# Patient Record
Sex: FEMALE | Race: WHITE | NOT HISPANIC OR LATINO | Employment: OTHER | URBAN - METROPOLITAN AREA
[De-identification: names, ages, dates, MRNs, and addresses within clinical notes are randomized per-mention and may not be internally consistent; named-entity substitution may affect disease eponyms.]

---

## 2023-03-20 DIAGNOSIS — F41.1 GENERALIZED ANXIETY DISORDER WITH PANIC ATTACKS: Primary | ICD-10-CM

## 2023-03-20 DIAGNOSIS — F41.0 GENERALIZED ANXIETY DISORDER WITH PANIC ATTACKS: Primary | ICD-10-CM

## 2023-03-20 DIAGNOSIS — G44.209 ACUTE NON INTRACTABLE TENSION-TYPE HEADACHE: ICD-10-CM

## 2023-03-20 PROBLEM — G43.909 MIGRAINE HEADACHE: Status: ACTIVE | Noted: 2023-03-20

## 2023-03-20 RX ORDER — CLORAZEPATE DIPOTASSIUM 3.75 MG/1
TABLET ORAL
COMMUNITY
End: 2023-03-20 | Stop reason: SDUPTHER

## 2023-03-20 RX ORDER — BUTALBITAL, ACETAMINOPHEN AND CAFFEINE 300; 40; 50 MG/1; MG/1; MG/1
1 CAPSULE ORAL
COMMUNITY
Start: 2020-01-14 | End: 2023-03-20 | Stop reason: SDUPTHER

## 2023-03-20 RX ORDER — BUTALBITAL, ACETAMINOPHEN AND CAFFEINE 300; 40; 50 MG/1; MG/1; MG/1
1 CAPSULE ORAL DAILY PRN
Qty: 12 CAPSULE | Refills: 2 | Status: SHIPPED | OUTPATIENT
Start: 2023-03-20 | End: 2024-05-31 | Stop reason: WASHOUT

## 2023-03-20 RX ORDER — CLORAZEPATE DIPOTASSIUM 3.75 MG/1
3.75 TABLET ORAL 2 TIMES DAILY PRN
Qty: 60 TABLET | Refills: 0 | Status: SHIPPED | OUTPATIENT
Start: 2023-03-20 | End: 2023-05-18

## 2023-03-20 NOTE — PROGRESS NOTES
30 day supply of clorazepate sent to pharmacy along with Fioricet.    I have personally reviewed the OARRS report.  This report is scanned into the electronic medical record.  I have considered the risks of abuse, dependence, addiction and diversion.  I believe that it is clinically appropriate to prescribe this medication. Masha Wallace, DO

## 2023-03-20 NOTE — PROGRESS NOTES
Email from patient:    Hi. This is Alondra Magaña, [birth year].  Could you please refill my Fioracet and my Tranxene. My psychiatrist was prescribing my Tranxene but he is no longer a [facility] provider. I am seeing someone new next month.  You have prescribed my Fioracet, but it's been a couple years since it's been ordered. I only use it on occasion.  Unfortunately, with my mom sick, I have used it a little more often for tension headaches.  I didn't call in because it's a little easier when I want to include explanations.  I still use [facility] in [place].  Thank you.    I see you in May.

## 2023-05-04 PROBLEM — F51.04 CHRONIC INSOMNIA: Status: ACTIVE | Noted: 2023-05-04

## 2023-05-04 PROBLEM — Z98.84 STATUS POST BARIATRIC SURGERY: Status: ACTIVE | Noted: 2023-05-04

## 2023-05-04 PROBLEM — R94.4 DECREASED GFR: Status: ACTIVE | Noted: 2023-05-04

## 2023-05-04 PROBLEM — R82.998 SWEET URINE ODOR: Status: ACTIVE | Noted: 2023-05-04

## 2023-05-04 PROBLEM — E55.9 VITAMIN D DEFICIENCY: Status: ACTIVE | Noted: 2023-05-04

## 2023-05-04 PROBLEM — G47.33 OBSTRUCTIVE SLEEP APNEA: Status: ACTIVE | Noted: 2023-05-04

## 2023-05-04 PROBLEM — F32.1 DEPRESSION, MAJOR, SINGLE EPISODE, MODERATE (MULTI): Status: ACTIVE | Noted: 2023-05-04

## 2023-05-04 PROBLEM — M54.2 NECK PAIN: Status: ACTIVE | Noted: 2023-05-04

## 2023-05-04 PROBLEM — R30.0 DYSURIA: Status: ACTIVE | Noted: 2023-05-04

## 2023-05-04 PROBLEM — B37.2 CANDIDAL INTERTRIGO: Status: ACTIVE | Noted: 2023-05-04

## 2023-05-04 PROBLEM — E11.9 CONTROLLED TYPE 2 DIABETES MELLITUS WITHOUT COMPLICATION, WITHOUT LONG-TERM CURRENT USE OF INSULIN (MULTI): Status: ACTIVE | Noted: 2023-05-04

## 2023-05-04 RX ORDER — DULAGLUTIDE 1.5 MG/.5ML
INJECTION, SOLUTION SUBCUTANEOUS
COMMUNITY
Start: 2019-11-20 | End: 2023-11-30 | Stop reason: SDUPTHER

## 2023-05-04 RX ORDER — KETOCONAZOLE 20 MG/G
CREAM TOPICAL
COMMUNITY
Start: 2022-06-06 | End: 2023-11-30 | Stop reason: ALTCHOICE

## 2023-05-04 RX ORDER — LANCETS
EACH MISCELLANEOUS
COMMUNITY
Start: 2022-12-06

## 2023-05-04 RX ORDER — TRAZODONE HYDROCHLORIDE 100 MG/1
100 TABLET ORAL NIGHTLY PRN
COMMUNITY
End: 2023-08-16

## 2023-05-04 RX ORDER — EPINEPHRINE 0.3 MG/.3ML
0.3 INJECTION SUBCUTANEOUS
COMMUNITY
Start: 2020-12-28 | End: 2024-01-11 | Stop reason: ALTCHOICE

## 2023-05-04 RX ORDER — LAMOTRIGINE 100 MG/1
1 TABLET ORAL 2 TIMES DAILY
COMMUNITY

## 2023-05-04 RX ORDER — VENLAFAXINE 50 MG/1
50 TABLET ORAL 2 TIMES DAILY
COMMUNITY
End: 2023-05-08

## 2023-05-04 RX ORDER — BLOOD SUGAR DIAGNOSTIC
STRIP MISCELLANEOUS
COMMUNITY
Start: 2022-12-06

## 2023-05-04 RX ORDER — CEFUROXIME AXETIL 250 MG/1
6 TABLET ORAL AS NEEDED
COMMUNITY
Start: 2020-01-14 | End: 2024-05-31 | Stop reason: WASHOUT

## 2023-05-04 RX ORDER — METHYLPHENIDATE HYDROCHLORIDE 20 MG/1
TABLET ORAL
COMMUNITY
Start: 2023-05-17 | End: 2023-11-30 | Stop reason: WASHOUT

## 2023-05-08 DIAGNOSIS — F32.1 DEPRESSION, MAJOR, SINGLE EPISODE, MODERATE (MULTI): ICD-10-CM

## 2023-05-08 DIAGNOSIS — F32.1 DEPRESSION, MAJOR, SINGLE EPISODE, MODERATE (MULTI): Primary | ICD-10-CM

## 2023-05-08 PROBLEM — F41.0 PANIC DISORDER: Status: ACTIVE | Noted: 2023-05-08

## 2023-05-08 PROBLEM — U07.1 COVID-19 VIRUS INFECTION: Status: ACTIVE | Noted: 2023-05-08

## 2023-05-08 PROBLEM — Z91.013 SHELLFISH ALLERGY: Status: ACTIVE | Noted: 2023-05-08

## 2023-05-08 PROBLEM — F41.1 GENERALIZED ANXIETY DISORDER: Status: ACTIVE | Noted: 2023-05-08

## 2023-05-08 RX ORDER — VENLAFAXINE 50 MG/1
TABLET ORAL
Qty: 90 TABLET | Refills: 1 | Status: SHIPPED | OUTPATIENT
Start: 2023-05-08 | End: 2023-08-11

## 2023-05-08 RX ORDER — VENLAFAXINE 50 MG/1
TABLET ORAL
Qty: 90 TABLET | Refills: 1 | Status: SHIPPED | OUTPATIENT
Start: 2023-05-08 | End: 2023-05-08

## 2023-05-08 RX ORDER — VENLAFAXINE 50 MG/1
TABLET ORAL
Qty: 60 TABLET | Refills: 2 | Status: SHIPPED | OUTPATIENT
Start: 2023-05-08 | End: 2023-05-08

## 2023-05-08 RX ORDER — CLORAZEPATE DIPOTASSIUM 3.75 MG/1
3.75 TABLET ORAL 2 TIMES DAILY PRN
COMMUNITY

## 2023-05-18 ENCOUNTER — OFFICE VISIT (OUTPATIENT)
Dept: PRIMARY CARE | Facility: CLINIC | Age: 63
End: 2023-05-18
Payer: COMMERCIAL

## 2023-05-18 VITALS
SYSTOLIC BLOOD PRESSURE: 112 MMHG | DIASTOLIC BLOOD PRESSURE: 76 MMHG | HEIGHT: 64 IN | HEART RATE: 91 BPM | OXYGEN SATURATION: 96 % | BODY MASS INDEX: 36.7 KG/M2 | WEIGHT: 215 LBS

## 2023-05-18 DIAGNOSIS — E11.9 CONTROLLED TYPE 2 DIABETES MELLITUS WITHOUT COMPLICATION, WITHOUT LONG-TERM CURRENT USE OF INSULIN (MULTI): ICD-10-CM

## 2023-05-18 DIAGNOSIS — Z00.00 ANNUAL PHYSICAL EXAM: Primary | ICD-10-CM

## 2023-05-18 DIAGNOSIS — N95.1 MENOPAUSAL SYMPTOMS: ICD-10-CM

## 2023-05-18 DIAGNOSIS — B37.2 CANDIDAL INTERTRIGO: ICD-10-CM

## 2023-05-18 DIAGNOSIS — F33.1 MAJOR DEPRESSIVE DISORDER, RECURRENT EPISODE, MODERATE (MULTI): ICD-10-CM

## 2023-05-18 PROBLEM — Z98.84 H/O GASTRIC BYPASS: Status: ACTIVE | Noted: 2021-05-20

## 2023-05-18 PROBLEM — U07.1 COVID-19 VIRUS INFECTION: Status: RESOLVED | Noted: 2023-05-08 | Resolved: 2023-05-18

## 2023-05-18 PROBLEM — F41.9 ANXIETY: Status: ACTIVE | Noted: 2021-05-20

## 2023-05-18 PROBLEM — Z98.84 H/O GASTRIC BYPASS: Status: RESOLVED | Noted: 2021-05-20 | Resolved: 2023-05-18

## 2023-05-18 PROBLEM — F41.9 ANXIETY: Status: RESOLVED | Noted: 2021-05-20 | Resolved: 2023-05-18

## 2023-05-18 PROBLEM — R94.4 DECREASED GFR: Status: RESOLVED | Noted: 2023-05-04 | Resolved: 2023-05-18

## 2023-05-18 PROBLEM — H02.9 EYELID LESION, BENIGN: Chronic | Status: RESOLVED | Noted: 2020-10-15 | Resolved: 2023-05-18

## 2023-05-18 PROBLEM — H02.831 DERMATOCHALASIS OF BOTH UPPER EYELIDS: Chronic | Status: ACTIVE | Noted: 2021-10-21

## 2023-05-18 PROBLEM — H02.9 EYELID LESION, BENIGN: Chronic | Status: ACTIVE | Noted: 2020-10-15

## 2023-05-18 PROBLEM — H43.813 PVD (POSTERIOR VITREOUS DETACHMENT), BILATERAL: Chronic | Status: ACTIVE | Noted: 2017-09-21

## 2023-05-18 PROBLEM — R41.89 BRAIN FOG: Status: ACTIVE | Noted: 2023-04-24

## 2023-05-18 PROBLEM — R30.0 DYSURIA: Status: RESOLVED | Noted: 2023-05-04 | Resolved: 2023-05-18

## 2023-05-18 PROBLEM — F41.1 GENERALIZED ANXIETY DISORDER: Status: RESOLVED | Noted: 2023-05-08 | Resolved: 2023-05-18

## 2023-05-18 PROBLEM — H02.834 DERMATOCHALASIS OF BOTH UPPER EYELIDS: Chronic | Status: ACTIVE | Noted: 2021-10-21

## 2023-05-18 PROBLEM — R82.998 SWEET URINE ODOR: Status: RESOLVED | Noted: 2023-05-04 | Resolved: 2023-05-18

## 2023-05-18 PROBLEM — F41.0 PANIC DISORDER: Status: RESOLVED | Noted: 2023-05-08 | Resolved: 2023-05-18

## 2023-05-18 PROCEDURE — 99396 PREV VISIT EST AGE 40-64: CPT | Performed by: FAMILY MEDICINE

## 2023-05-18 PROCEDURE — 3078F DIAST BP <80 MM HG: CPT | Performed by: FAMILY MEDICINE

## 2023-05-18 PROCEDURE — 1036F TOBACCO NON-USER: CPT | Performed by: FAMILY MEDICINE

## 2023-05-18 PROCEDURE — 3074F SYST BP LT 130 MM HG: CPT | Performed by: FAMILY MEDICINE

## 2023-05-18 RX ORDER — FLUCONAZOLE 150 MG/1
TABLET ORAL
Qty: 3 TABLET | Refills: 5 | Status: SHIPPED | OUTPATIENT
Start: 2023-05-18 | End: 2023-11-30 | Stop reason: ALTCHOICE

## 2023-05-18 SDOH — ECONOMIC STABILITY: FOOD INSECURITY: WITHIN THE PAST 12 MONTHS, THE FOOD YOU BOUGHT JUST DIDN'T LAST AND YOU DIDN'T HAVE MONEY TO GET MORE.: NEVER TRUE

## 2023-05-18 SDOH — ECONOMIC STABILITY: FOOD INSECURITY: WITHIN THE PAST 12 MONTHS, YOU WORRIED THAT YOUR FOOD WOULD RUN OUT BEFORE YOU GOT MONEY TO BUY MORE.: NEVER TRUE

## 2023-05-18 ASSESSMENT — PATIENT HEALTH QUESTIONNAIRE - PHQ9
3. TROUBLE FALLING OR STAYING ASLEEP: NOT AT ALL
4. FEELING TIRED OR HAVING LITTLE ENERGY: NEARLY EVERY DAY
SUM OF ALL RESPONSES TO PHQ9 QUESTIONS 1 & 2: 6
9. THOUGHTS THAT YOU WOULD BE BETTER OFF DEAD, OR OF HURTING YOURSELF: NOT AT ALL
2. FEELING DOWN, DEPRESSED OR HOPELESS: NEARLY EVERY DAY
10. IF YOU CHECKED OFF ANY PROBLEMS, HOW DIFFICULT HAVE THESE PROBLEMS MADE IT FOR YOU TO DO YOUR WORK, TAKE CARE OF THINGS AT HOME, OR GET ALONG WITH OTHER PEOPLE: EXTREMELY DIFFICULT
1. LITTLE INTEREST OR PLEASURE IN DOING THINGS: NEARLY EVERY DAY
SUM OF ALL RESPONSES TO PHQ QUESTIONS 1-9: 18
7. TROUBLE CONCENTRATING ON THINGS, SUCH AS READING THE NEWSPAPER OR WATCHING TELEVISION: NEARLY EVERY DAY
5. POOR APPETITE OR OVEREATING: NOT AT ALL
6. FEELING BAD ABOUT YOURSELF - OR THAT YOU ARE A FAILURE OR HAVE LET YOURSELF OR YOUR FAMILY DOWN: NEARLY EVERY DAY
8. MOVING OR SPEAKING SO SLOWLY THAT OTHER PEOPLE COULD HAVE NOTICED. OR THE OPPOSITE, BEING SO FIGETY OR RESTLESS THAT YOU HAVE BEEN MOVING AROUND A LOT MORE THAN USUAL: NEARLY EVERY DAY

## 2023-05-18 ASSESSMENT — LIFESTYLE VARIABLES
SKIP TO QUESTIONS 9-10: 1
HOW MANY STANDARD DRINKS CONTAINING ALCOHOL DO YOU HAVE ON A TYPICAL DAY: PATIENT DOES NOT DRINK
HOW OFTEN DO YOU HAVE SIX OR MORE DRINKS ON ONE OCCASION: NEVER
AUDIT-C TOTAL SCORE: 0
HOW OFTEN DO YOU HAVE A DRINK CONTAINING ALCOHOL: NEVER

## 2023-05-18 NOTE — PATIENT INSTRUCTIONS
Thank you for choosing [facility] for your healthcare.   As always if you have any questions or concerns please do not hesitate to call our office at [phone] or through [employer].    Have a great day!  Masha Wallace, DO

## 2023-05-18 NOTE — PROGRESS NOTES
"Subjective   Patient ID: Alondra Magaña is a 62 y.o. female who presents for Annual Exam.  She started seeing psychiatry. Her dose of Lamictal was increased and methylphenidate was added to her regimen. She is also on venlafaxine. Her depression continues to be bad. She is worried about her close friend who has horses.           Current Outpatient Medications   Medication Sig Dispense Refill    Accu-Chek Guide test strips strip       Accu-Chek Softclix Lancets misc       butalbital-acetaminophen-caff (Fioricet) -40 mg capsule Take 1 capsule by mouth once daily as needed for headaches (not to exceed 3 doses in 7 days due to risk of rebound headache). 12 capsule 2    clorazepate (Tranxene) 3.75 mg tablet Take 1 tablet (3.75 mg) by mouth 2 times a day as needed for anxiety. 60 tablet 0    lamoTRIgine (LaMICtal) 100 mg tablet Take 1 tablet (100 mg) by mouth once daily at bedtime. 75 mg AM  75 mg PM      methylphenidate (Ritalin) 20 mg tablet 1 tablet in the morning 1/2 tablet in the afternoon Do not start before May 17, 2023.      SUMAtriptan (Imitrex) 6 mg/0.5 mL injection Inject under the skin.      traZODone (Desyrel) 100 mg tablet Take 1 tablet (100 mg) by mouth as needed at bedtime.      Trulicity 1.5 mg/0.5 mL pen injector Inject under the skin.      venlafaxine (Effexor) 50 mg tablet TAKE 1 TABLET BY MOUTH TWICE DAILY 90 tablet 1    cariprazine (Vraylar) 1.5 mg capsule 1 capsule Mon and Friday      clorazepate (Tranxene) 3.75 mg tablet Take 1 tablet (3.75 mg) by mouth.      EPINEPHrine 0.3 mg/0.3 mL injection syringe 0.3 mL (0.3 mg). As Directed      fluconazole (Diflucan) 150 mg tablet 1 tablet by mouth every 3 days for 3 doses 3 tablet 5    ketoconazole (NIZOral) 2 % cream APPLY CREAM TOPICALLY DAILY FOR 2 WEEKS TO AFFECTED AREA       No current facility-administered medications for this visit.       Objective     Visit Vitals  /76   Pulse 91   Ht 1.626 m (5' 4\")   Wt 97.5 kg (215 lb)   SpO2 96% "   BMI 36.90 kg/m²   OB Status Postmenopausal   Smoking Status Never   BSA 2.1 m²        Constitutional: Well nourished, well developed, appears stated age  Eyes: no scleral icterus, no conjunctival injection  Ears: normal external auditory canal, no retraction or bulging of tympanic membranes  Neck: no thyromegaly  Cardiovascular: regular rate and rhythm, no leg edema  Respiratory: normal respiratory effort, clear to auscultation bilaterally  Musculoskeletal: No gross deformities appreciated  Skin: Warm, dry. No rashes  Neurologic: Alert, CNs II-XII grossly intact..  Psych: Appropriate mood and affect.        Assessment/Plan   Problem List Items Addressed This Visit          Endocrine/Metabolic    Controlled type 2 diabetes mellitus without complication, without long-term current use of insulin (CMS/HCC)     Wide variation in blood sugar, trying to adjust diet  Continue Trulicity         Relevant Orders    Comprehensive Metabolic Panel    Hemoglobin A1C    TSH with reflex to Free T4 if abnormal       Infectious/Inflammatory    Candidal intertrigo    Relevant Medications    fluconazole (Diflucan) 150 mg tablet       Other    Major depressive disorder, recurrent episode, moderate (CMS/HCC)     Uncontrolled, situational  Established with psychiatry          Other Visit Diagnoses       Annual physical exam    -  Primary    Has order for mammogram, plans to schedule          Follow up 6 months.     Masha Wallace, DO

## 2023-06-02 ENCOUNTER — LAB (OUTPATIENT)
Dept: LAB | Facility: LAB | Age: 63
End: 2023-06-02
Payer: COMMERCIAL

## 2023-06-02 DIAGNOSIS — E11.9 CONTROLLED TYPE 2 DIABETES MELLITUS WITHOUT COMPLICATION, WITHOUT LONG-TERM CURRENT USE OF INSULIN (MULTI): ICD-10-CM

## 2023-06-02 DIAGNOSIS — N95.1 MENOPAUSAL SYMPTOMS: ICD-10-CM

## 2023-06-02 LAB
ALANINE AMINOTRANSFERASE (SGPT) (U/L) IN SER/PLAS: 19 U/L (ref 7–45)
ALBUMIN (G/DL) IN SER/PLAS: 4 G/DL (ref 3.4–5)
ALKALINE PHOSPHATASE (U/L) IN SER/PLAS: 74 U/L (ref 33–136)
ANION GAP IN SER/PLAS: 10 MMOL/L (ref 10–20)
ASPARTATE AMINOTRANSFERASE (SGOT) (U/L) IN SER/PLAS: 18 U/L (ref 9–39)
BILIRUBIN TOTAL (MG/DL) IN SER/PLAS: 0.6 MG/DL (ref 0–1.2)
CALCIUM (MG/DL) IN SER/PLAS: 9.2 MG/DL (ref 8.6–10.3)
CARBON DIOXIDE, TOTAL (MMOL/L) IN SER/PLAS: 29 MMOL/L (ref 21–32)
CHLORIDE (MMOL/L) IN SER/PLAS: 104 MMOL/L (ref 98–107)
CREATININE (MG/DL) IN SER/PLAS: 0.88 MG/DL (ref 0.5–1.05)
GFR FEMALE: 74 ML/MIN/1.73M2
GLUCOSE (MG/DL) IN SER/PLAS: 86 MG/DL (ref 74–99)
POTASSIUM (MMOL/L) IN SER/PLAS: 4 MMOL/L (ref 3.5–5.3)
PROTEIN TOTAL: 6.5 G/DL (ref 6.4–8.2)
SODIUM (MMOL/L) IN SER/PLAS: 139 MMOL/L (ref 136–145)
THYROTROPIN (MIU/L) IN SER/PLAS BY DETECTION LIMIT <= 0.05 MIU/L: 0.56 MIU/L (ref 0.44–3.98)
UREA NITROGEN (MG/DL) IN SER/PLAS: 11 MG/DL (ref 6–23)

## 2023-06-02 PROCEDURE — 84443 ASSAY THYROID STIM HORMONE: CPT

## 2023-06-02 PROCEDURE — 82670 ASSAY OF TOTAL ESTRADIOL: CPT

## 2023-06-02 PROCEDURE — 36415 COLL VENOUS BLD VENIPUNCTURE: CPT

## 2023-06-02 PROCEDURE — 83001 ASSAY OF GONADOTROPIN (FSH): CPT

## 2023-06-02 PROCEDURE — 80053 COMPREHEN METABOLIC PANEL: CPT

## 2023-06-02 PROCEDURE — 84403 ASSAY OF TOTAL TESTOSTERONE: CPT

## 2023-06-02 PROCEDURE — 83002 ASSAY OF GONADOTROPIN (LH): CPT

## 2023-06-02 PROCEDURE — 83036 HEMOGLOBIN GLYCOSYLATED A1C: CPT

## 2023-06-02 PROCEDURE — 84144 ASSAY OF PROGESTERONE: CPT

## 2023-06-03 LAB
ESTIMATED AVERAGE GLUCOSE FOR HBA1C: 123 MG/DL
ESTRADIOL (PG/ML) IN SER/PLAS: <19 PG/ML
FOLLITROPIN (IU/L) IN SER/PLAS: 83.4 IU/L
HEMOGLOBIN A1C/HEMOGLOBIN TOTAL IN BLOOD: 5.9 %
LUTEINIZING HORMONE (IU/ML) IN SER/PLAS: 30.7 IU/L
PROGESTERONE (NG/ML) IN SER/PLAS: <0.3 NG/ML
TESTOSTERONE (NG/DL) IN SER/PLAS: <60 NG/DL (ref 0–70)

## 2023-08-08 ENCOUNTER — TELEPHONE (OUTPATIENT)
Dept: PRIMARY CARE | Facility: CLINIC | Age: 63
End: 2023-08-08

## 2023-08-08 DIAGNOSIS — E11.9 CONTROLLED TYPE 2 DIABETES MELLITUS WITHOUT COMPLICATION, WITHOUT LONG-TERM CURRENT USE OF INSULIN (MULTI): Primary | ICD-10-CM

## 2023-08-08 RX ORDER — BLOOD-GLUCOSE,RECEIVER,CONT
EACH MISCELLANEOUS
Qty: 1 EACH | Refills: 0 | Status: SHIPPED | OUTPATIENT
Start: 2023-08-08 | End: 2023-11-30 | Stop reason: WASHOUT

## 2023-08-08 RX ORDER — BLOOD-GLUCOSE SENSOR
EACH MISCELLANEOUS
Qty: 3 EACH | Refills: 11 | Status: SHIPPED | OUTPATIENT
Start: 2023-08-08 | End: 2023-11-30 | Stop reason: WASHOUT

## 2023-08-08 NOTE — TELEPHONE ENCOUNTER
PT CALLED ASKING FOR DEXCOM G7 (3 IN BOX) BLOOD PRESSURE MONITOR THAT YOU WEAR ON YOUR ARM. SHE SAID SHE EMAILED DR. LOPEZ, BUT DIDN'T GET A RESPONSE. PLEASE ADVISE.    PLEASE SEND TO [facility] [phone]

## 2023-08-08 NOTE — TELEPHONE ENCOUNTER
I never got her email.  Rx submitted to requested pharmacy.   Can you let her know? Thanks,  Masha Wallace, DO

## 2023-08-09 ENCOUNTER — TELEPHONE (OUTPATIENT)
Dept: PRIMARY CARE | Facility: CLINIC | Age: 63
End: 2023-08-09

## 2023-08-09 NOTE — TELEPHONE ENCOUNTER
Insurance plan excludes Dexcom from coverage. Can you let her know?   Thanks,  Masha Wallace, DO

## 2023-08-09 NOTE — TELEPHONE ENCOUNTER
Patient said that it is affordable out of pocket if it goes through the [facility] pharmacy.

## 2023-08-11 DIAGNOSIS — F32.1 DEPRESSION, MAJOR, SINGLE EPISODE, MODERATE (MULTI): ICD-10-CM

## 2023-08-11 RX ORDER — VENLAFAXINE 50 MG/1
TABLET ORAL
Qty: 90 TABLET | Refills: 3 | Status: SHIPPED | OUTPATIENT
Start: 2023-08-11 | End: 2023-08-11

## 2023-08-11 RX ORDER — VENLAFAXINE 50 MG/1
50 TABLET ORAL 2 TIMES DAILY
Qty: 180 TABLET | Refills: 3 | Status: SHIPPED | OUTPATIENT
Start: 2023-08-11

## 2023-08-11 RX ORDER — LAMOTRIGINE 200 MG/1
200 TABLET ORAL DAILY
COMMUNITY
Start: 2023-08-03 | End: 2023-11-30 | Stop reason: DRUGHIGH

## 2023-08-15 DIAGNOSIS — F51.04 CHRONIC INSOMNIA: Primary | ICD-10-CM

## 2023-08-16 RX ORDER — TRAZODONE HYDROCHLORIDE 100 MG/1
100 TABLET ORAL NIGHTLY PRN
Qty: 90 TABLET | Refills: 3 | Status: SHIPPED | OUTPATIENT
Start: 2023-08-16 | End: 2024-08-15

## 2023-10-25 ENCOUNTER — TELEPHONE (OUTPATIENT)
Dept: PRIMARY CARE | Facility: CLINIC | Age: 63
End: 2023-10-25

## 2023-10-25 DIAGNOSIS — N39.0 ACUTE UTI: Primary | ICD-10-CM

## 2023-10-25 RX ORDER — SULFAMETHOXAZOLE AND TRIMETHOPRIM 800; 160 MG/1; MG/1
1 TABLET ORAL 2 TIMES DAILY
Qty: 6 TABLET | Refills: 0 | Status: SHIPPED | OUTPATIENT
Start: 2023-10-25 | End: 2023-10-28

## 2023-10-25 NOTE — TELEPHONE ENCOUNTER
Email from patient:    I think I have a UTI. I am leaving for vacation on the Thursday.  I have burning and slight pain. Urgency and frequency. Slight odor.   The urgency is killing me. It's terrible if I have on a belt.  I'm hoping it's a UTI and not old age!!  Anyway, can you call something in for me. [facility] on [facility].  It would be terrible to have a problem at the beach.  Thanks,   Christine Magaña  1960

## 2023-10-31 ENCOUNTER — PHARMACY VISIT (OUTPATIENT)
Dept: PHARMACY | Facility: CLINIC | Age: 63
End: 2023-10-31
Payer: COMMERCIAL

## 2023-10-31 PROCEDURE — RXMED WILLOW AMBULATORY MEDICATION CHARGE

## 2023-11-30 ENCOUNTER — OFFICE VISIT (OUTPATIENT)
Dept: PRIMARY CARE | Facility: CLINIC | Age: 63
End: 2023-11-30
Payer: COMMERCIAL

## 2023-11-30 VITALS
HEART RATE: 74 BPM | BODY MASS INDEX: 36.73 KG/M2 | SYSTOLIC BLOOD PRESSURE: 118 MMHG | WEIGHT: 214 LBS | TEMPERATURE: 96.6 F | RESPIRATION RATE: 16 BRPM | OXYGEN SATURATION: 99 % | DIASTOLIC BLOOD PRESSURE: 83 MMHG

## 2023-11-30 DIAGNOSIS — Z98.84 STATUS POST BARIATRIC SURGERY: ICD-10-CM

## 2023-11-30 DIAGNOSIS — E11.9 CONTROLLED TYPE 2 DIABETES MELLITUS WITHOUT COMPLICATION, WITHOUT LONG-TERM CURRENT USE OF INSULIN (MULTI): Primary | ICD-10-CM

## 2023-11-30 DIAGNOSIS — Z12.11 COLON CANCER SCREENING: ICD-10-CM

## 2023-11-30 PROBLEM — N95.1 MENOPAUSAL AND FEMALE CLIMACTERIC STATES: Status: ACTIVE | Noted: 2023-06-02

## 2023-11-30 PROBLEM — F32.A DEPRESSIVE DISORDER: Status: RESOLVED | Noted: 2023-10-19 | Resolved: 2023-11-30

## 2023-11-30 PROBLEM — J40 BRONCHITIS: Status: RESOLVED | Noted: 2023-10-19 | Resolved: 2023-11-30

## 2023-11-30 PROBLEM — F41.9 ANXIETY: Status: RESOLVED | Noted: 2021-05-20 | Resolved: 2023-11-30

## 2023-11-30 PROBLEM — J40 BRONCHITIS: Status: ACTIVE | Noted: 2023-10-19

## 2023-11-30 PROBLEM — B37.2 CANDIDAL INTERTRIGO: Status: RESOLVED | Noted: 2023-05-04 | Resolved: 2023-11-30

## 2023-11-30 PROBLEM — F32.1 DEPRESSION, MAJOR, SINGLE EPISODE, MODERATE (MULTI): Status: RESOLVED | Noted: 2023-05-04 | Resolved: 2023-11-30

## 2023-11-30 PROBLEM — F41.9 ANXIETY: Status: ACTIVE | Noted: 2021-05-20

## 2023-11-30 PROBLEM — F32.A DEPRESSIVE DISORDER: Status: ACTIVE | Noted: 2023-10-19

## 2023-11-30 PROCEDURE — 1036F TOBACCO NON-USER: CPT | Performed by: FAMILY MEDICINE

## 2023-11-30 PROCEDURE — 3079F DIAST BP 80-89 MM HG: CPT | Performed by: FAMILY MEDICINE

## 2023-11-30 PROCEDURE — 3074F SYST BP LT 130 MM HG: CPT | Performed by: FAMILY MEDICINE

## 2023-11-30 PROCEDURE — 3044F HG A1C LEVEL LT 7.0%: CPT | Performed by: FAMILY MEDICINE

## 2023-11-30 PROCEDURE — 99213 OFFICE O/P EST LOW 20 MIN: CPT | Performed by: FAMILY MEDICINE

## 2023-11-30 RX ORDER — BUTYROSPERMUM PARKII(SHEA BUTTER), SIMMONDSIA CHINENSIS (JOJOBA) SEED OIL, ALOE BARBADENSIS LEAF EXTRACT .01; 1; 3.5 G/100G; G/100G; G/100G
250 LIQUID TOPICAL 2 TIMES DAILY
COMMUNITY
End: 2024-01-11 | Stop reason: ALTCHOICE

## 2023-11-30 RX ORDER — CALCITRIOL 0.25 UG/1
0.25 CAPSULE ORAL DAILY
COMMUNITY
End: 2024-01-11 | Stop reason: ALTCHOICE

## 2023-11-30 SDOH — ECONOMIC STABILITY: FOOD INSECURITY: WITHIN THE PAST 12 MONTHS, YOU WORRIED THAT YOUR FOOD WOULD RUN OUT BEFORE YOU GOT MONEY TO BUY MORE.: NEVER TRUE

## 2023-11-30 SDOH — ECONOMIC STABILITY: FOOD INSECURITY: WITHIN THE PAST 12 MONTHS, THE FOOD YOU BOUGHT JUST DIDN'T LAST AND YOU DIDN'T HAVE MONEY TO GET MORE.: NEVER TRUE

## 2023-11-30 ASSESSMENT — LIFESTYLE VARIABLES
AUDIT-C TOTAL SCORE: 0
SKIP TO QUESTIONS 9-10: 1
HOW OFTEN DO YOU HAVE A DRINK CONTAINING ALCOHOL: NEVER
HOW OFTEN DO YOU HAVE SIX OR MORE DRINKS ON ONE OCCASION: NEVER
HOW MANY STANDARD DRINKS CONTAINING ALCOHOL DO YOU HAVE ON A TYPICAL DAY: PATIENT DOES NOT DRINK

## 2023-11-30 ASSESSMENT — PATIENT HEALTH QUESTIONNAIRE - PHQ9
2. FEELING DOWN, DEPRESSED OR HOPELESS: NEARLY EVERY DAY
1. LITTLE INTEREST OR PLEASURE IN DOING THINGS: NEARLY EVERY DAY
SUM OF ALL RESPONSES TO PHQ9 QUESTIONS 1 & 2: 6

## 2023-11-30 ASSESSMENT — PAIN SCALES - GENERAL: PAINLEVEL: 0-NO PAIN

## 2023-11-30 NOTE — PATIENT INSTRUCTIONS
Thank you for choosing [facility] for your healthcare.   As always if you have any questions or concerns please do not hesitate to call our office at [phone] or through [employer].    Have a great day!  Masha Wallace, DO

## 2023-11-30 NOTE — PROGRESS NOTES
Subjective   Patient ID: Alondra Magaña is a 63 y.o. female who presents for Follow-up, Depression, and Diabetes.  Struggling with depression and a lot of stress at work. Trying to cut back on hours or possibly retire. Has follow up with her psychiatrist in 2 weeks.         In addition to that documented in the HPI above, the additional ROS was obtained:  Constitutional: Denies fevers or chills  Eyes: Denies vision changes  ENMT: Denies trouble swallowing  Cardiovascular: Denies chest pain or heart racing  Respiratory: Denies SOB or cough      Current Outpatient Medications   Medication Sig Dispense Refill    Accu-Chek Guide test strips strip       Accu-Chek Softclix Lancets misc       butalbital-acetaminophen-caff (Fioricet) -40 mg capsule Take 1 capsule by mouth once daily as needed for headaches (not to exceed 3 doses in 7 days due to risk of rebound headache). 12 capsule 2    calcitriol (Rocaltrol) 0.25 mcg capsule Take 1 capsule (0.25 mcg) by mouth once daily.      cariprazine (Vraylar) 1.5 mg capsule Three times a week      clorazepate (Tranxene) 3.75 mg tablet Take 1 tablet (3.75 mg) by mouth.      dulaglutide (Trulicity) 1.5 mg/0.5 mL pen injector injection INJECT ONE PEN (1.5 MG) UNDER THE SKIN ONCE WEEKLY 6 mL 3    EPINEPHrine 0.3 mg/0.3 mL injection syringe 0.3 mL (0.3 mg). As Directed      lamoTRIgine (LaMICtal) 100 mg tablet Take 1 tablet (100 mg) by mouth 2 times a day. 100mg AM  100 mg PM      saccharomyces boulardii (Florastor) 250 mg capsule Take 1 capsule (250 mg) by mouth 2 times a day.      SUMAtriptan (Imitrex) 6 mg/0.5 mL injection Inject under the skin.      traZODone (Desyrel) 100 mg tablet Take 1 tablet (100 mg) by mouth as needed at bedtime for sleep. 90 tablet 3    venlafaxine (Effexor) 50 mg tablet Take 1 tablet (50 mg) by mouth 2 times a day. (Patient taking differently: Take 1.5 tablets (75 mg) by mouth 2 times a day.) 180 tablet 3    clorazepate (Tranxene) 3.75 mg tablet Take 1  tablet (3.75 mg) by mouth 2 times a day as needed for anxiety. 60 tablet 0     No current facility-administered medications for this visit.       Objective     Visit Vitals  /83 (BP Location: Left arm, Patient Position: Sitting, BP Cuff Size: Adult)   Pulse 74   Temp 35.9 °C (96.6 °F) (Temporal)   Resp 16   Wt 97.1 kg (214 lb)   SpO2 99%   BMI 36.73 kg/m²   OB Status Postmenopausal   Smoking Status Never   BSA 2.09 m²        Constitutional: Well nourished, well developed, appears stated age  Eyes: no scleral icterus, no conjunctival injection  Ears: normal external auditory canal, no retraction or bulging of tympanic membranes  Neck: no thyromegaly  Cardiovascular: regular rate and rhythm, no leg edema  Respiratory: normal respiratory effort, clear to auscultation bilaterally  Musculoskeletal: No gross deformities appreciated  Skin: Warm, dry. No rashes  Neurologic: Alert, CNs II-XII grossly intact..  Psych: Appropriate mood and affect.      Diabetic Foot Exam  Pulses:  Dorsalis pedis  +2  Edema: There is no lower extremity edema bilaterally.  Sensation to 5.07 [place] nylon filament: Last performed today.      Toes            normal sensation       Distal Foot  normal sensation      Heel             normal sensation                                Skin:      Skin Condition:  no eczematous changes, no edema, no evidence of bleeding or bruising, no lesions noted, no periungual infections, and normal.                There is not evidence of macerated tissue between toe spaces.       Nail Exam: normal nails without lesions.     Open ulcerations: No.      Calluses: No.    There is not foot deformities.        Assessment/Plan   Problem List Items Addressed This Visit       Controlled type 2 diabetes mellitus without complication, without long-term current use of insulin (CMS/[facility]) - Primary (Chronic)     Controlled, Continue Trulicity         Relevant Orders    Hemoglobin A1C    Comprehensive Metabolic Panel     Lipid Panel    Albumin , Urine Random    Status post bariatric surgery    Relevant Orders    CBC     Other Visit Diagnoses       Colon cancer screening        Relevant Orders    Referral to Gastroenterology            Follow up 6 months.    Masha Wallace, DO

## 2023-12-06 ENCOUNTER — LAB (OUTPATIENT)
Dept: LAB | Facility: LAB | Age: 63
End: 2023-12-06
Payer: COMMERCIAL

## 2023-12-06 DIAGNOSIS — E11.9 CONTROLLED TYPE 2 DIABETES MELLITUS WITHOUT COMPLICATION, WITHOUT LONG-TERM CURRENT USE OF INSULIN (MULTI): ICD-10-CM

## 2023-12-06 DIAGNOSIS — Z98.84 STATUS POST BARIATRIC SURGERY: ICD-10-CM

## 2023-12-06 LAB
ALBUMIN SERPL BCP-MCNC: 4 G/DL (ref 3.4–5)
ALP SERPL-CCNC: 78 U/L (ref 33–136)
ALT SERPL W P-5'-P-CCNC: 27 U/L (ref 7–45)
ANION GAP SERPL CALC-SCNC: 9 MMOL/L (ref 10–20)
AST SERPL W P-5'-P-CCNC: 19 U/L (ref 9–39)
BILIRUB SERPL-MCNC: 0.7 MG/DL (ref 0–1.2)
BUN SERPL-MCNC: 9 MG/DL (ref 6–23)
CALCIUM SERPL-MCNC: 9.2 MG/DL (ref 8.6–10.3)
CHLORIDE SERPL-SCNC: 102 MMOL/L (ref 98–107)
CHOLEST SERPL-MCNC: 198 MG/DL (ref 0–199)
CHOLESTEROL/HDL RATIO: 2.8
CO2 SERPL-SCNC: 31 MMOL/L (ref 21–32)
CREAT SERPL-MCNC: 0.91 MG/DL (ref 0.5–1.05)
CREAT UR-MCNC: 115 MG/DL (ref 20–320)
ERYTHROCYTE [DISTWIDTH] IN BLOOD BY AUTOMATED COUNT: 13.2 % (ref 11.5–14.5)
EST. AVERAGE GLUCOSE BLD GHB EST-MCNC: 123 MG/DL
GFR SERPL CREATININE-BSD FRML MDRD: 71 ML/MIN/1.73M*2
GLUCOSE SERPL-MCNC: 88 MG/DL (ref 74–99)
HBA1C MFR BLD: 5.9 %
HCT VFR BLD AUTO: 42.4 % (ref 36–46)
HDLC SERPL-MCNC: 70.7 MG/DL
HGB BLD-MCNC: 13.8 G/DL (ref 12–16)
LDLC SERPL CALC-MCNC: 108 MG/DL
MCH RBC QN AUTO: 30.3 PG (ref 26–34)
MCHC RBC AUTO-ENTMCNC: 32.5 G/DL (ref 32–36)
MCV RBC AUTO: 93 FL (ref 80–100)
MICROALBUMIN UR-MCNC: 19.4 MG/L
MICROALBUMIN/CREAT UR: 16.9 UG/MG CREAT
NON HDL CHOLESTEROL: 127 MG/DL (ref 0–149)
NRBC BLD-RTO: 0 /100 WBCS (ref 0–0)
PLATELET # BLD AUTO: 356 X10*3/UL (ref 150–450)
POTASSIUM SERPL-SCNC: 4.7 MMOL/L (ref 3.5–5.3)
PROT SERPL-MCNC: 6.6 G/DL (ref 6.4–8.2)
RBC # BLD AUTO: 4.56 X10*6/UL (ref 4–5.2)
SODIUM SERPL-SCNC: 137 MMOL/L (ref 136–145)
TRIGL SERPL-MCNC: 95 MG/DL (ref 0–149)
VLDL: 19 MG/DL (ref 0–40)
WBC # BLD AUTO: 7.1 X10*3/UL (ref 4.4–11.3)

## 2023-12-06 PROCEDURE — 82570 ASSAY OF URINE CREATININE: CPT

## 2023-12-06 PROCEDURE — 85027 COMPLETE CBC AUTOMATED: CPT

## 2023-12-06 PROCEDURE — 82043 UR ALBUMIN QUANTITATIVE: CPT

## 2023-12-06 PROCEDURE — 83036 HEMOGLOBIN GLYCOSYLATED A1C: CPT

## 2023-12-06 PROCEDURE — 80053 COMPREHEN METABOLIC PANEL: CPT

## 2023-12-06 PROCEDURE — 80061 LIPID PANEL: CPT

## 2023-12-06 PROCEDURE — 36415 COLL VENOUS BLD VENIPUNCTURE: CPT

## 2023-12-11 DIAGNOSIS — E11.9 CONTROLLED TYPE 2 DIABETES MELLITUS WITHOUT COMPLICATION, WITHOUT LONG-TERM CURRENT USE OF INSULIN (MULTI): Primary | Chronic | ICD-10-CM

## 2023-12-11 RX ORDER — TIRZEPATIDE 5 MG/.5ML
5 INJECTION, SOLUTION SUBCUTANEOUS
Qty: 2 ML | Refills: 11 | Status: SHIPPED | OUTPATIENT
Start: 2023-12-11 | End: 2024-12-10

## 2023-12-11 NOTE — PROGRESS NOTES
Patient would like to try switching from Trulicity to Mounjaro due to Trulicity makes her feel hungry.   Rx submitted. Masha Wallace, DO

## 2023-12-15 ENCOUNTER — PHARMACY VISIT (OUTPATIENT)
Dept: PHARMACY | Facility: CLINIC | Age: 63
End: 2023-12-15
Payer: COMMERCIAL

## 2023-12-15 PROCEDURE — RXMED WILLOW AMBULATORY MEDICATION CHARGE

## 2023-12-18 ENCOUNTER — TELEPHONE (OUTPATIENT)
Dept: PRIMARY CARE | Facility: CLINIC | Age: 63
End: 2023-12-18

## 2023-12-18 DIAGNOSIS — B37.9 YEAST INFECTION: Primary | ICD-10-CM

## 2023-12-18 NOTE — TELEPHONE ENCOUNTER
Patient called stating she tried to get a refill of fluconazole and the pharmacy states the script was cancelled.  Patient is asking if a new script can be sent    fluconazole (Diflucan) 150 mg tablet    Pharmacy [facility] - [address]

## 2023-12-20 ENCOUNTER — TELEPHONE (OUTPATIENT)
Dept: PRIMARY CARE | Facility: CLINIC | Age: 63
End: 2023-12-20

## 2023-12-20 DIAGNOSIS — R41.89 COGNITIVE CHANGES: Primary | ICD-10-CM

## 2023-12-20 NOTE — TELEPHONE ENCOUNTER
Email from patient:  Hi! Happy Holidays.  My Psyc NP wants me to see a neurologist. She says she's wondering if I have mild cognitive dementia. Wants me to have an MRI.  She's scared me. I think she is very good at what she does. Her name is Maryann Cota ([facility]). I am having some serious issues. Extreme memory and recall issues, effecting my work. Not related to any medications. She's had me stop a number of them to see.  I have a family history of dementia and Parkinson's, my mother has Parkinson's and grandmother and great aunts had either Parkinson's or dementia. She does see my mother but my mother is not aware of this.  Anyway, I need a referral to a neurologist. I would prefer one you recommend. Has to be [facility].    Also, thank you for letting me email you. It is so much easier than calling the office and trying to explain what I need.  I appreciate you.  Christine Magaña  1960

## 2023-12-21 RX ORDER — FLUCONAZOLE 150 MG/1
150 TABLET ORAL ONCE
Qty: 1 TABLET | Refills: 0 | Status: SHIPPED | OUTPATIENT
Start: 2023-12-21 | End: 2023-12-21

## 2024-01-11 ENCOUNTER — OFFICE VISIT (OUTPATIENT)
Dept: GASTROENTEROLOGY | Facility: CLINIC | Age: 64
End: 2024-01-11
Payer: COMMERCIAL

## 2024-01-11 VITALS
SYSTOLIC BLOOD PRESSURE: 127 MMHG | BODY MASS INDEX: 35.36 KG/M2 | DIASTOLIC BLOOD PRESSURE: 82 MMHG | HEART RATE: 87 BPM | WEIGHT: 220 LBS | OXYGEN SATURATION: 96 % | HEIGHT: 66 IN

## 2024-01-11 DIAGNOSIS — Z12.11 COLON CANCER SCREENING: ICD-10-CM

## 2024-01-11 PROCEDURE — 3079F DIAST BP 80-89 MM HG: CPT | Performed by: INTERNAL MEDICINE

## 2024-01-11 PROCEDURE — 1036F TOBACCO NON-USER: CPT | Performed by: INTERNAL MEDICINE

## 2024-01-11 PROCEDURE — 3074F SYST BP LT 130 MM HG: CPT | Performed by: INTERNAL MEDICINE

## 2024-01-11 PROCEDURE — 99204 OFFICE O/P NEW MOD 45 MIN: CPT | Performed by: INTERNAL MEDICINE

## 2024-01-11 NOTE — PROGRESS NOTES
[facility]enter[facility]    ASSESSMENT and PLAN:       Alondra Magaña is a 63 y.o. female with a significant past medical history of insulin-dependent diabetes mellitus, migraine who presents for consultation requested by her primary care provider (Masha Wallace DO) for the evaluation of the need for screening colonoscopy.       Average risk CRC  -Does admit his intermittent incontinence with bowel movements and urination  -Trial of high-fiber diet with 30 g daily to help bulk stools  -Colonoscopy for evaluation no improvement high-fiber diet will refer to  Urology for pelvic floor therapy and pelvic floor evaluation        Brian Maradiaga DO         Gastroenterology    [facility]            Subjective   HISTORY OF PRESENT ILLNESS:     Chief Complaint  Colon Cancer Screening (Last colon was over 10 years/Is having trouble with bowels - stool coming out with urination , incontinence)    History Of Present Illness:      Alondra Magaña is a 63 y.o. female with a significant past medical history of insulin-dependent diabetes mellitus, migraine who presents for consultation requested by her primary care provider (Masha Wallace DO) for the evaluation of the need for screening colonoscopy.     Patient does admit to some fecal incontinence that is intermitted she states this happens every once in a while.  She has not tried any high-fiber diet.  She denies any blood in her stool.  Abdominal pain or change in bowel habits.      She will     Patient denies any heartburn/GERD, N/V, dysphagia, odynophagia, abdominal pain, diarrhea, constipation, hematemesis, hematochezia, melena, or weight loss.      Endoscopy History:    Colon in 10 years     Review of systems:   Review of Systems      I performed a complete 10 point review of systems and it is negative except as noted in HPI or above.        PAST HISTORIES:       Past Medical History:  She has a  "past medical history of Fracture of unspecified part of left clavicle, initial encounter for closed fracture (01/21/2019), Personal history of other diseases of the respiratory system (01/18/2017), and Type 2 diabetes mellitus with hyperglycemia (CMS/[facility]) (08/18/2020).    Past Surgical History:  She has a past surgical history that includes Other surgical history (11/19/2020); Other surgical history (11/19/2020); and Other surgical history (11/19/2020).      Social History:  She reports that she has never smoked. She has never used smokeless tobacco. She reports that she does not currently use alcohol. She reports that she does not use drugs.    Family History:  No known GI disease, specifically denies pancreatitis, Crohn's, colon cancer, gastroesophageal cancer, or ulcerative colitis.    Family History   Problem Relation Name Age of Onset    Colon cancer Maternal Grandfather          Allergies:  Nsaids (non-steroidal anti-inflammatory drug), Shellfish containing products, and Shellfish derived        Objective   OBJECTIVE:       Last Recorded Vitals:  Vitals:    01/11/24 0901   BP: 127/82   Pulse: 87   SpO2: 96%   Weight: 99.8 kg (220 lb)   Height: 1.664 m (5' 5.5\")     /82   Pulse 87   Ht 1.664 m (5' 5.5\")   Wt 99.8 kg (220 lb)   SpO2 96%   BMI 36.05 kg/m²      Physical Exam:    Physical Exam  Vitals reviewed.   Constitutional:       General: She is awake.      Appearance: Normal appearance.   HENT:      Head: Normocephalic.      Mouth/Throat:      Mouth: Mucous membranes are moist.   Eyes:      Extraocular Movements: Extraocular movements intact.   Cardiovascular:      Rate and Rhythm: Normal rate.      Heart sounds: Normal heart sounds.   Pulmonary:      Effort: Pulmonary effort is normal.      Breath sounds: Normal breath sounds.   Abdominal:      General: Bowel sounds are normal.      Palpations: Abdomen is soft.      Tenderness: There is no abdominal tenderness. There is no guarding or rebound.     "  Hernia: No hernia is present.   Musculoskeletal:         General: Normal range of motion.      Cervical back: Neck supple.   Skin:     General: Skin is warm and dry.   Neurological:      General: No focal deficit present.      Mental Status: She is alert.   Psychiatric:         Attention and Perception: Attention and perception normal.         Mood and Affect: Mood normal.         Behavior: Behavior normal.             Home Medications:  Prior to Admission medications    Medication Sig Start Date End Date Taking? Authorizing Provider   Accu-Cherhett Guide test strips strip  12/6/22   Historical Provider, MD   Accu-Cherhett Softclix Lancets misc  12/6/22   Historical Provider, MD   butalbital-acetaminophen-caff (Fioricet) -40 mg capsule Take 1 capsule by mouth once daily as needed for headaches (not to exceed 3 doses in 7 days due to risk of rebound headache). 3/20/23   [facility] Factor, DO   cariprazine (Vraylar) 1.5 mg capsule Three times a week 4/24/23   Historical Provider, MD   clorazepate (Tranxene) 3.75 mg tablet Take 1 tablet (3.75 mg) by mouth 2 times a day as needed for anxiety. 3/20/23 5/18/23  [facility] Factor, DO   clorazepate (Tranxene) 3.75 mg tablet Take 1 tablet (3.75 mg) by mouth.    Historical Provider, MD   lamoTRIgine (LaMICtal) 100 mg tablet Take 1 tablet (100 mg) by mouth 2 times a day. 100mg AM  100 mg PM    Historical Provider, MD   SUMAtriptan (Imitrex) 6 mg/0.5 mL injection Inject under the skin. 1/14/20   Historical Provider, MD   tirzepatide (Mounjaro) 5 mg/0.5 mL pen injector Inject 5 mg under the skin 1 (one) time per week. 12/11/23 12/10/24  [facility] Factor, DO   traZODone (Desyrel) 100 mg tablet Take 1 tablet (100 mg) by mouth as needed at bedtime for sleep. 8/16/23 8/15/24  [facility] Factor, DO   venlafaxine (Effexor) 50 mg tablet Take 1 tablet (50 mg) by mouth 2 times a day.  Patient taking differently: Take 1.5 tablets (75 mg) by mouth 2 times a day. 8/11/23   [facility]  "Factor, DO   calcitriol (Rocaltrol) 0.25 mcg capsule Take 1 capsule (0.25 mcg) by mouth once daily.  1/11/24  Historical Provider, MD   EPINEPHrine 0.3 mg/0.3 mL injection syringe 0.3 mL (0.3 mg). As Directed 12/28/20 1/11/24  Historical Provider, MD   saccharomyces boulardii (Florastor) 250 mg capsule Take 1 capsule (250 mg) by mouth 2 times a day.  1/11/24  Historical Provider, MD         Relevant Results Recent labs reviewed in the EMR.  Lab Results   Component Value Date    HGB 13.8 12/06/2023    HGB 13.6 12/08/2021    MCV 93 12/06/2023     12/06/2023       Lab Results   Component Value Date    IRON 65 12/08/2021       Lab Results   Component Value Date     12/06/2023    K 4.7 12/06/2023     12/06/2023    BUN 9 12/06/2023    CREATININE 0.91 12/06/2023       Lab Results   Component Value Date    BILITOT 0.7 12/06/2023     Lab Results   Component Value Date    ALT 27 12/06/2023    ALT 19 06/02/2023    ALT 27 12/02/2022    ALT 26 10/15/2021    AST 19 12/06/2023    AST 18 06/02/2023    AST 19 12/02/2022    AST 20 10/15/2021    ALKPHOS 78 12/06/2023    ALKPHOS 74 06/02/2023    ALKPHOS 70 12/02/2022    ALKPHOS 91 10/15/2021       No results found for: \"CRP\"    No results found for: \"CALPS\"    Radiology: Reviewed imaging reviewed in the EMR.  No results found.      "

## 2024-01-11 NOTE — PATIENT INSTRUCTIONS
I recommend a high fiber diet 30g daily to help bulk the stools       You have been scheduled for a colonoscopy.  You were given instructions for preparing for this test in the office today.  If you have questions about these instructions, please call my office at [phone].    After your procedure, you can expect me to talk to you to go over the results of the procedure. If polyps were removed I will usually be able to tell you my initial thoughts about those polyps and give you some idea of when you should have another colonoscopy.      You were also given information regarding the schedule for your procedure including the time that you need to arrive to the endoscopy unit.  You will also be contacted 2-3 day prior to your procedure to confirm the final arrival time.  If you have questions about this or if you need to cancel or change this appointment please call my office at [phone].

## 2024-01-12 RX ORDER — SODIUM CHLORIDE 9 MG/ML
20 INJECTION, SOLUTION INTRAVENOUS CONTINUOUS
Status: CANCELLED | OUTPATIENT
Start: 2024-01-12

## 2024-01-18 ENCOUNTER — APPOINTMENT (OUTPATIENT)
Dept: GASTROENTEROLOGY | Facility: HOSPITAL | Age: 64
End: 2024-01-18
Payer: COMMERCIAL

## 2024-01-31 PROCEDURE — RXMED WILLOW AMBULATORY MEDICATION CHARGE

## 2024-02-01 ENCOUNTER — PHARMACY VISIT (OUTPATIENT)
Dept: PHARMACY | Facility: CLINIC | Age: 64
End: 2024-02-01
Payer: COMMERCIAL

## 2024-02-16 DIAGNOSIS — E11.9 CONTROLLED TYPE 2 DIABETES MELLITUS WITHOUT COMPLICATION, WITHOUT LONG-TERM CURRENT USE OF INSULIN (MULTI): Primary | Chronic | ICD-10-CM

## 2024-02-19 ENCOUNTER — HOSPITAL ENCOUNTER (OUTPATIENT)
Dept: GASTROENTEROLOGY | Facility: HOSPITAL | Age: 64
Discharge: HOME | End: 2024-02-19
Payer: COMMERCIAL

## 2024-02-19 ENCOUNTER — ANESTHESIA (OUTPATIENT)
Dept: GASTROENTEROLOGY | Facility: HOSPITAL | Age: 64
End: 2024-02-19
Payer: COMMERCIAL

## 2024-02-19 ENCOUNTER — ANESTHESIA EVENT (OUTPATIENT)
Dept: GASTROENTEROLOGY | Facility: HOSPITAL | Age: 64
End: 2024-02-19
Payer: COMMERCIAL

## 2024-02-19 VITALS
RESPIRATION RATE: 16 BRPM | DIASTOLIC BLOOD PRESSURE: 69 MMHG | WEIGHT: 220 LBS | SYSTOLIC BLOOD PRESSURE: 109 MMHG | HEART RATE: 74 BPM | HEIGHT: 65 IN | BODY MASS INDEX: 36.65 KG/M2 | TEMPERATURE: 97.2 F | OXYGEN SATURATION: 95 %

## 2024-02-19 DIAGNOSIS — Z12.11 COLON CANCER SCREENING: ICD-10-CM

## 2024-02-19 PROBLEM — Z98.890 PONV (POSTOPERATIVE NAUSEA AND VOMITING): Status: ACTIVE | Noted: 2024-02-19

## 2024-02-19 PROBLEM — R11.2 PONV (POSTOPERATIVE NAUSEA AND VOMITING): Status: ACTIVE | Noted: 2024-02-19

## 2024-02-19 PROCEDURE — 2500000004 HC RX 250 GENERAL PHARMACY W/ HCPCS (ALT 636 FOR OP/ED): Performed by: INTERNAL MEDICINE

## 2024-02-19 PROCEDURE — 45380 COLONOSCOPY AND BIOPSY: CPT | Performed by: INTERNAL MEDICINE

## 2024-02-19 PROCEDURE — 7100000010 HC PHASE TWO TIME - EACH INCREMENTAL 1 MINUTE

## 2024-02-19 PROCEDURE — 3700000001 HC GENERAL ANESTHESIA TIME - INITIAL BASE CHARGE

## 2024-02-19 PROCEDURE — 3700000002 HC GENERAL ANESTHESIA TIME - EACH INCREMENTAL 1 MINUTE

## 2024-02-19 PROCEDURE — 2500000004 HC RX 250 GENERAL PHARMACY W/ HCPCS (ALT 636 FOR OP/ED): Performed by: NURSE ANESTHETIST, CERTIFIED REGISTERED

## 2024-02-19 PROCEDURE — 7100000009 HC PHASE TWO TIME - INITIAL BASE CHARGE

## 2024-02-19 PROCEDURE — 88305 TISSUE EXAM BY PATHOLOGIST: CPT | Performed by: PATHOLOGY

## 2024-02-19 PROCEDURE — 2500000005 HC RX 250 GENERAL PHARMACY W/O HCPCS: Performed by: NURSE ANESTHETIST, CERTIFIED REGISTERED

## 2024-02-19 PROCEDURE — 88305 TISSUE EXAM BY PATHOLOGIST: CPT | Mod: TC,SUR,PORLAB | Performed by: INTERNAL MEDICINE

## 2024-02-19 RX ORDER — FENTANYL CITRATE 50 UG/ML
INJECTION, SOLUTION INTRAMUSCULAR; INTRAVENOUS AS NEEDED
Status: DISCONTINUED | OUTPATIENT
Start: 2024-02-19 | End: 2024-02-19

## 2024-02-19 RX ORDER — PROPOFOL 10 MG/ML
INJECTION, EMULSION INTRAVENOUS AS NEEDED
Status: DISCONTINUED | OUTPATIENT
Start: 2024-02-19 | End: 2024-02-19

## 2024-02-19 RX ORDER — SODIUM CHLORIDE 9 MG/ML
20 INJECTION, SOLUTION INTRAVENOUS CONTINUOUS
Status: DISCONTINUED | OUTPATIENT
Start: 2024-02-19 | End: 2024-02-20 | Stop reason: HOSPADM

## 2024-02-19 RX ORDER — LIDOCAINE HYDROCHLORIDE 20 MG/ML
INJECTION, SOLUTION INFILTRATION; PERINEURAL AS NEEDED
Status: DISCONTINUED | OUTPATIENT
Start: 2024-02-19 | End: 2024-02-19

## 2024-02-19 RX ADMIN — PROPOFOL 50 MG: 10 INJECTION, EMULSION INTRAVENOUS at 08:39

## 2024-02-19 RX ADMIN — SODIUM CHLORIDE 20 ML/HR: 9 INJECTION, SOLUTION INTRAVENOUS at 07:42

## 2024-02-19 RX ADMIN — LIDOCAINE HYDROCHLORIDE 2 ML: 20 INJECTION, SOLUTION INFILTRATION; PERINEURAL at 08:35

## 2024-02-19 RX ADMIN — PROPOFOL 50 MG: 10 INJECTION, EMULSION INTRAVENOUS at 08:46

## 2024-02-19 RX ADMIN — PROPOFOL 20 MG: 10 INJECTION, EMULSION INTRAVENOUS at 08:54

## 2024-02-19 RX ADMIN — PROPOFOL 100 MG: 10 INJECTION, EMULSION INTRAVENOUS at 08:35

## 2024-02-19 RX ADMIN — FENTANYL CITRATE 25 MCG: 50 INJECTION INTRAMUSCULAR; INTRAVENOUS at 08:40

## 2024-02-19 RX ADMIN — FENTANYL CITRATE 50 MCG: 50 INJECTION INTRAMUSCULAR; INTRAVENOUS at 08:35

## 2024-02-19 RX ADMIN — FENTANYL CITRATE 25 MCG: 50 INJECTION INTRAMUSCULAR; INTRAVENOUS at 08:46

## 2024-02-19 RX ADMIN — PROPOFOL 50 MG: 10 INJECTION, EMULSION INTRAVENOUS at 08:50

## 2024-02-19 SDOH — HEALTH STABILITY: MENTAL HEALTH: CURRENT SMOKER: 0

## 2024-02-19 ASSESSMENT — PAIN SCALES - GENERAL
PAINLEVEL_OUTOF10: 0 - NO PAIN

## 2024-02-19 ASSESSMENT — COLUMBIA-SUICIDE SEVERITY RATING SCALE - C-SSRS
2. HAVE YOU ACTUALLY HAD ANY THOUGHTS OF KILLING YOURSELF?: NO
6. HAVE YOU EVER DONE ANYTHING, STARTED TO DO ANYTHING, OR PREPARED TO DO ANYTHING TO END YOUR LIFE?: NO
1. IN THE PAST MONTH, HAVE YOU WISHED YOU WERE DEAD OR WISHED YOU COULD GO TO SLEEP AND NOT WAKE UP?: NO

## 2024-02-19 ASSESSMENT — PAIN - FUNCTIONAL ASSESSMENT
PAIN_FUNCTIONAL_ASSESSMENT: 0-10

## 2024-02-19 NOTE — ANESTHESIA POSTPROCEDURE EVALUATION
Patient: Alondra Magaña    Procedure Summary       Date: 02/19/24 Room / Location: [facility]    Anesthesia Start: 0829 Anesthesia Stop: 0904    Procedure: COLONOSCOPY Diagnosis: Colon cancer screening    Scheduled Providers: Brian Maradiaga DO Responsible Provider: MIRYAM Ann    Anesthesia Type: MAC ASA Status: 3            Anesthesia Type: MAC    Vitals Value Taken Time   /58 02/19/24 0904   Temp 36.7 °C (98.1 °F) 02/19/24 0904   Pulse 80 02/19/24 0904   Resp 16 02/19/24 0904   SpO2 92 % 02/19/24 0904       Anesthesia Post Evaluation    Patient location during evaluation: bedside  Patient participation: complete - patient participated  Level of consciousness: awake and alert  Pain management: adequate  Airway patency: patent  Cardiovascular status: acceptable  Respiratory status: acceptable  Hydration status: acceptable  Postoperative Nausea and Vomiting: none    There were no known notable events for this encounter.

## 2024-02-19 NOTE — H&P
History Of Present Illness  Alondra Magaña is a 63 y.o. female presenting for screening colon.     Past Medical History  Past Medical History:   Diagnosis Date    Fracture of unspecified part of left clavicle, initial encounter for closed fracture 01/21/2019    Fracture of left clavicle    Personal history of other diseases of the respiratory system 01/18/2017    History of bronchitis    Sleep apnea     Type 2 diabetes mellitus with hyperglycemia (CMS/[facility]) 08/18/2020    Uncontrolled type 2 diabetes mellitus with hyperglycemia       Surgical History  Past Surgical History:   Procedure Laterality Date    OTHER SURGICAL HISTORY  11/19/2020    Cholecystectomy    OTHER SURGICAL HISTORY  11/19/2020    Bariatric surgery    OTHER SURGICAL HISTORY  11/19/2020    Open reduction-internal fixation        Social History  She reports that she has never smoked. She has never used smokeless tobacco. She reports that she does not currently use alcohol. She reports that she does not use drugs.    Family History  Family History   Problem Relation Name Age of Onset    Colon cancer Maternal Grandfather          Allergies  Nsaids (non-steroidal anti-inflammatory drug), Shellfish containing products, and Shellfish derived    Review of Systems     Physical Exam  Vitals reviewed.   Constitutional:       General: She is awake.      Appearance: Normal appearance.   HENT:      Head: Normocephalic and atraumatic.      Nose: Nose normal.      Mouth/Throat:      Mouth: Mucous membranes are moist.   Eyes:      Pupils: Pupils are equal, round, and reactive to light.   Cardiovascular:      Rate and Rhythm: Normal rate.   Pulmonary:      Effort: Pulmonary effort is normal.   Neurological:      Mental Status: She is alert and oriented to person, place, and time. Mental status is at baseline.   Psychiatric:         Attention and Perception: Attention and perception normal.         Mood and Affect: Mood normal.         Behavior: Behavior normal.         "  Last Recorded Vitals  Blood pressure 124/79, pulse 83, temperature 36.6 °C (97.8 °F), temperature source Temporal, resp. rate 16, height 1.651 m (5' 5\"), weight 99.8 kg (220 lb), SpO2 99 %.    Relevant Results        Current Outpatient Medications   Medication Instructions    Accu-Chek Guide test strips strip     Accu-Chek Softclix Lancets misc     butalbital-acetaminophen-caff (Fioricet) -40 mg capsule 1 capsule, oral, Daily PRN    cariprazine (Vraylar) 1.5 mg capsule Three times a week    clorazepate (TRANXENE) 3.75 mg, oral, 2 times daily PRN    clorazepate (TRANXENE) 3.75 mg, oral    lamoTRIgine (LaMICtal) 100 mg tablet 1 tablet, oral, 2 times daily, 100mg AM<BR>100 mg PM    Mounjaro 5 mg, subcutaneous, Weekly    SUMAtriptan (Imitrex) 6 mg/0.5 mL injection subcutaneous    traZODone (DESYREL) 100 mg, oral, Nightly PRN    venlafaxine (EFFEXOR) 50 mg, oral, 2 times daily          Assessment/Plan   Active Problems:  There are no active Hospital Problems.        Average Risk Colorectal Cancer   - screening colonoscopy for evaluation     Brian Maradiaga DO    "

## 2024-02-19 NOTE — ANESTHESIA PREPROCEDURE EVALUATION
Patient: Alondra Magaña    Procedure Information       Date/Time: 02/19/24 0830    Scheduled providers: Brian Maradiaga DO    Procedure: COLONOSCOPY    Location:  [facility]            Relevant Problems   Anesthesia   (+) PONV (postoperative nausea and vomiting)      Cardiovascular (within normal limits)      Endocrine   (+) Class 2 severe obesity due to excess calories with serious comorbidity and body mass index (BMI) of 36.0 to 36.9 in adult (CMS/[facility])   (+) Controlled type 2 diabetes mellitus without complication, without long-term current use of insulin (CMS/[facility])      GI   (+) Esophageal reflux      /Renal (within normal limits)      Neuro/Psych   (+) Generalized anxiety disorder with panic attacks   (+) Major depressive disorder, recurrent episode, moderate (CMS/[facility])      Pulmonary   (+) Obstructive sleep apnea      GI/Hepatic (within normal limits)      Hematology (within normal limits)      Eyes, Ears, Nose, and Throat (within normal limits)       Clinical information reviewed:   Tobacco  Allergies  Meds   Med Hx  Surg Hx   Fam Hx  Soc Hx        NPO Detail:  NPO/Void Status  Date of Last Liquid: 02/19/24  Time of Last Liquid: 0330  Date of Last Solid: 02/17/24  Last Intake Type: Clear fluids         Physical Exam    Airway  Mallampati: II  TM distance: >3 FB  Neck ROM: full     Cardiovascular - normal exam  Rhythm: regular     Dental - normal exam     Pulmonary - normal exam     Abdominal - normal exam         Anesthesia Plan    History of general anesthesia?: yes  History of complications of general anesthesia?: no    ASA 3     MAC     The patient is not a current smoker.    intravenous induction   Anesthetic plan and risks discussed with patient.

## 2024-02-22 ENCOUNTER — PHARMACY VISIT (OUTPATIENT)
Dept: PHARMACY | Facility: CLINIC | Age: 64
End: 2024-02-22
Payer: COMMERCIAL

## 2024-02-22 PROCEDURE — RXMED WILLOW AMBULATORY MEDICATION CHARGE

## 2024-02-23 LAB
LABORATORY COMMENT REPORT: NORMAL
PATH REPORT.FINAL DX SPEC: NORMAL
PATH REPORT.GROSS SPEC: NORMAL
PATH REPORT.RELEVANT HX SPEC: NORMAL
PATH REPORT.TOTAL CANCER: NORMAL

## 2024-02-29 ENCOUNTER — TELEMEDICINE (OUTPATIENT)
Dept: PHARMACY | Facility: HOSPITAL | Age: 64
End: 2024-02-29

## 2024-02-29 DIAGNOSIS — E11.9 CONTROLLED TYPE 2 DIABETES MELLITUS WITHOUT COMPLICATION, WITHOUT LONG-TERM CURRENT USE OF INSULIN (MULTI): Chronic | ICD-10-CM

## 2024-02-29 NOTE — ASSESSMENT & PLAN NOTE
Diabetes: Controlled with last A1c 5.9% on 12/6/24. Current regimen includes Mounjaro 5 mg weekly. Patient states she is tolerating this well and reports no missed doses. Home BG readings fasting range 112-138. Patient denies any symptoms of low or high blood sugars. Due to A1c controlled, plan to continue current regimen.  Continue taking Mounjaro 5 mg weekly  Continue to monitor and record blood sugars daily  Follow healthy plate method  Increase physical activity as tolerated

## 2024-02-29 NOTE — PROGRESS NOTES
Alondra Magaña is a 63 y.o. female was referred to Clinical Pharmacy Team to complete a comprehensive medication review (CMR) with a pharmacist as part of the [employer] diabetes program.      Referring Provider: Masha Wallace DO    Subjective   Allergies   Allergen Reactions    Nsaids (Non-Steroidal Anti-Inflammatory Drug) Other     Gastric Bypass    Other reaction(s): Contraindication-Medical Surgical, Other   Gastric Bypass   Gastric Bypass    Shellfish Containing Products Unknown    Shellfish Derived Other       [facility] [address] [place] [address]  Phone: [phone] Fax: [phone]    [facility] - [place], [place] - [address]  [address]  [facility] [address]  Phone: [phone] Fax: [phone]     [facility]  [address]  [facility] [address]  Phone: [phone] Fax: [phone]      [facility]  PMH significant for T2DM, bariatric surgery, obesity, insomnia, CANDIDO.  Patient has no known history of medullary thyroid cancer, pancreatitis, or complicated UTI.  Diagnosed 2019. Known DM complications include obesity.  Special needs/barriers to therapy: VBID    Lifestyle  Diet: 2-3 meals/day.   BK: rarely pancakes, yogurt, fruit, oatmeal  LN: leftovers from dinner, sandwich, soup  DN: mac and cheese, soup, salad  Snacks: muffins occasionally   Drinks: protein shakes, water, diet coke    Physical Activity: none    Objective     There were no vitals taken for this visit.     LAB  Lab Results   Component Value Date    BILITOT 0.7 12/06/2023    CALCIUM 9.2 12/06/2023    CO2 31 12/06/2023     12/06/2023    CREATININE 0.91 12/06/2023    GLUCOSE 88 12/06/2023    ALKPHOS 78 12/06/2023    K 4.7 12/06/2023    PROT 6.6 12/06/2023     12/06/2023    AST 19 12/06/2023    ALT 27 12/06/2023    BUN 9 12/06/2023    ANIONGAP 9 (L) 12/06/2023    ALBUMIN  4.0 12/06/2023    GFRF 74 06/02/2023     Lab Results   Component Value Date    TRIG 95 12/06/2023    CHOL 198 12/06/2023    LDLCALC 108 (H) 12/06/2023    HDL 70.7 12/06/2023     Lab Results   Component Value Date    HGBA1C 5.9 (H) 12/06/2023       Current Outpatient Medications on File Prior to Visit   Medication Sig Dispense Refill    Accu-Chek Guide test strips strip       Accu-Chek Softclix Lancets misc       butalbital-acetaminophen-caff (Fioricet) -40 mg capsule Take 1 capsule by mouth once daily as needed for headaches (not to exceed 3 doses in 7 days due to risk of rebound headache). 12 capsule 2    cariprazine (Vraylar) 1.5 mg capsule Take 1 capsule (1.5 mg) by mouth once daily.      clorazepate (Tranxene) 3.75 mg tablet Take 1 tablet (3.75 mg) by mouth 2 times a day as needed.      lamoTRIgine (LaMICtal) 100 mg tablet Take 1 tablet (100 mg) by mouth 2 times a day. 100mg AM  100 mg PM      SUMAtriptan (Imitrex) 6 mg/0.5 mL injection Inject 0.5 mL (6 mg) under the skin if needed.      tirzepatide (Mounjaro) 5 mg/0.5 mL pen injector Inject 5 mg under the skin 1 (one) time per week. 2 mL 11    traZODone (Desyrel) 100 mg tablet Take 1 tablet (100 mg) by mouth as needed at bedtime for sleep. 90 tablet 3    venlafaxine (Effexor) 50 mg tablet Take 1 tablet (50 mg) by mouth 2 times a day. (Patient taking differently: Take 2 tablets (100 mg) by mouth 2 times a day.) 180 tablet 3    clorazepate (Tranxene) 3.75 mg tablet Take 1 tablet (3.75 mg) by mouth 2 times a day as needed for anxiety. 60 tablet 0     No current facility-administered medications on file prior to visit.        Diabetes Management  Current Medications: Mounjaro 5 mg weekly (Sundays)  Previous Medications: Trulicity, metformin (efficacy)     Adherence: Takes medication as directed and reports no missed doses  Adverse Effects: none    Glucose Monitoring  Glucometer/CGM Type: Accucheck Guide    Current home BG readings: fasting = 112-138      Hypoglycemia: Patient denies signs and symptoms  Hyperglycemia: Denies signs and symptoms    DRUG INTERACTIONS  - No significant drug interactions    SECONDARY PREVENTION  - Statin? no  - ACE-I/ARB? no    ASSESSMENT/PLAN:   Employee Health Diabetes Program (VBID)  - Patient enrolled in  Employee diabetes program for $0 co-pays on diabetes medications/supplies. Enrollment should be active in 2-4 weeks and will be valid for one year dependant upon patient remaining on  prescription insurance plan and filling at a  pharmacy. After 1 year, patient will require another consult with the clinical pharmacy team.  - Requested VBID enrollment date: 2/29/24  - PharmD Management Level: 1    Assessment/Plan   Problem List Items Addressed This Visit       Controlled type 2 diabetes mellitus without complication, without long-term current use of insulin (CMS/[facility]) (Chronic)     Diabetes: Controlled with last A1c 5.9% on 12/6/24. Current regimen includes Mounjaro 5 mg weekly. Patient states she is tolerating this well and reports no missed doses. Home BG readings fasting range 112-138. Patient denies any symptoms of low or high blood sugars. Due to A1c controlled, plan to continue current regimen.  Continue taking Mounjaro 5 mg weekly  Continue to monitor and record blood sugars daily  Follow healthy plate method  Increase physical activity as tolerated             Patient Education:  Counseled patient on relevant MOA, expectations, side effects, duration of therapy, contraindications, administration, and monitoring parameters.  All questions and concerns addressed. Contact pharmacist with any further questions or concerns prior to next appointment.  Reviewed dietary recommendations: Healthy Plate method  Reviewed BG goals: Fasting BG goal , Postprandial BG goal <180 mg/dL, A1C goal <7%  Reviewed signs, symptoms, and treatment of hypoglycemia.    Follow up: 11 months for VBID renewal     Continue all meds under the  continuation of care with the referring provider and clinical pharmacy team.    Makenna Bhatti, H[place]     Verbal consent to manage patient's drug therapy was obtained from the patient. They were informed they may decline to participate or withdraw from participation in pharmacy services at any time.

## 2024-02-29 NOTE — PATIENT INSTRUCTIONS
Hi!    It was very nice talking with you today. Follow up with me scheduled 11 months from today for renewal of the employee diabetes program for the $0 co-pays on diabetes medications and supplies. Below is a brief overview of what we talked about at today's appointment.  Continue taking Mounjaro 5 mg weekly  Continue to monitor and record blood sugars daily  Follow healthy plate method  Increase physical activity as tolerated     If you ever have any questions or concerns, feel free to reach out.    Makenna Bhatti, PharmD  [phone]

## 2024-03-01 NOTE — PROGRESS NOTES
I reviewed the progress note and agree with the resident’s findings and plans as written. Case discussed with resident.    Whitney Alves, PharmD

## 2024-03-07 ENCOUNTER — HOSPITAL ENCOUNTER (OUTPATIENT)
Dept: RADIOLOGY | Facility: CLINIC | Age: 64
Discharge: HOME | End: 2024-03-07
Payer: COMMERCIAL

## 2024-03-07 VITALS — BODY MASS INDEX: 36.7 KG/M2 | HEIGHT: 64 IN | WEIGHT: 215 LBS

## 2024-03-07 DIAGNOSIS — Z12.31 ENCOUNTER FOR SCREENING MAMMOGRAM FOR MALIGNANT NEOPLASM OF BREAST: ICD-10-CM

## 2024-03-07 DIAGNOSIS — R92.8 ABNORMALITY OF RIGHT BREAST ON SCREENING MAMMOGRAM: Primary | ICD-10-CM

## 2024-03-07 DIAGNOSIS — Z13.820 ENCOUNTER FOR SCREENING FOR OSTEOPOROSIS: ICD-10-CM

## 2024-03-07 DIAGNOSIS — Z78.0 ASYMPTOMATIC MENOPAUSAL STATE: ICD-10-CM

## 2024-03-07 PROCEDURE — 77063 BREAST TOMOSYNTHESIS BI: CPT | Mod: BILATERAL PROCEDURE

## 2024-03-07 PROCEDURE — 77067 SCR MAMMO BI INCL CAD: CPT

## 2024-03-07 PROCEDURE — 77080 DXA BONE DENSITY AXIAL: CPT | Performed by: RADIOLOGY

## 2024-03-07 PROCEDURE — 77080 DXA BONE DENSITY AXIAL: CPT

## 2024-03-07 PROCEDURE — 77067 SCR MAMMO BI INCL CAD: CPT | Mod: BILATERAL PROCEDURE

## 2024-03-21 ENCOUNTER — HOSPITAL ENCOUNTER (OUTPATIENT)
Dept: RADIOLOGY | Facility: HOSPITAL | Age: 64
Discharge: HOME | End: 2024-03-21
Payer: COMMERCIAL

## 2024-03-21 ENCOUNTER — TELEPHONE (OUTPATIENT)
Dept: OBSTETRICS AND GYNECOLOGY | Facility: CLINIC | Age: 64
End: 2024-03-21

## 2024-03-21 DIAGNOSIS — R92.8 ABNORMALITY OF RIGHT BREAST ON SCREENING MAMMOGRAM: ICD-10-CM

## 2024-03-21 DIAGNOSIS — R92.8 ABNORMAL SCREENING MAMMOGRAM: Primary | ICD-10-CM

## 2024-03-21 PROCEDURE — 77061 BREAST TOMOSYNTHESIS UNI: CPT | Mod: RT

## 2024-03-21 PROCEDURE — 76982 USE 1ST TARGET LESION: CPT | Mod: RT

## 2024-03-21 PROCEDURE — RXMED WILLOW AMBULATORY MEDICATION CHARGE

## 2024-03-21 PROCEDURE — 76642 ULTRASOUND BREAST LIMITED: CPT | Mod: RT

## 2024-03-21 NOTE — TELEPHONE ENCOUNTER
Patient was informed, states radiology reviewed the following with her and she has no questions.    September 14, 2017      Yony Jackson MD  [address]  [facility] [address]           [facility] - Arrhythmia  [address]  [facility] [address]  Phone: [phone]  Fax: [phone]          Patient: Danita Hall   MR Number: [id number]   YOB: 1952   Date of Visit: 9/14/2017       Dear Dr. Yony Jackson:    Thank you for referring Danita Hall to me for evaluation. Attached you will find relevant portions of my assessment and plan of care.    If you have questions, please do not hesitate to call me. I look forward to following Danita Hall along with you.    Sincerely,    Roly Ramey MD    Enclosure  CC:  No Recipients    If you would like to receive this communication electronically, please contact [email or web address] or [phone] to request more information on Live[employer] Link access.    For providers and/or their staff who would like to refer a patient to [facility], please contact us through our one-stop-shop provider referral line, [facility], at [phone].    If you feel you have received this communication in error or would no longer like to receive these types of communications, please e-mail [email or web address]

## 2024-03-21 NOTE — TELEPHONE ENCOUNTER
----- Message from DONATO Siddiqi-CNP sent at 3/21/2024  2:00 PM EDT -----  Please inform patient that her breast ultrasound returned showing the asymmetrical area to be a sebaceous cyst, but they desire another follow up in 6 months.  The follow up is to visualize any changes and to re see the dermal duct.

## 2024-03-22 ENCOUNTER — PHARMACY VISIT (OUTPATIENT)
Dept: PHARMACY | Facility: CLINIC | Age: 64
End: 2024-03-22
Payer: COMMERCIAL

## 2024-04-18 PROCEDURE — RXMED WILLOW AMBULATORY MEDICATION CHARGE

## 2024-04-20 ENCOUNTER — PHARMACY VISIT (OUTPATIENT)
Dept: PHARMACY | Facility: CLINIC | Age: 64
End: 2024-04-20
Payer: COMMERCIAL

## 2024-04-22 ENCOUNTER — OFFICE VISIT (OUTPATIENT)
Dept: NEUROLOGY | Facility: HOSPITAL | Age: 64
End: 2024-04-22
Payer: COMMERCIAL

## 2024-04-22 VITALS
HEART RATE: 76 BPM | WEIGHT: 218 LBS | DIASTOLIC BLOOD PRESSURE: 84 MMHG | SYSTOLIC BLOOD PRESSURE: 125 MMHG | BODY MASS INDEX: 37.42 KG/M2

## 2024-04-22 DIAGNOSIS — F41.1 GENERALIZED ANXIETY DISORDER WITH PANIC ATTACKS: ICD-10-CM

## 2024-04-22 DIAGNOSIS — F33.1 MAJOR DEPRESSIVE DISORDER, RECURRENT EPISODE, MODERATE (MULTI): ICD-10-CM

## 2024-04-22 DIAGNOSIS — Z79.899 POLYPHARMACY: ICD-10-CM

## 2024-04-22 DIAGNOSIS — R25.3 MUSCLE TWITCH: ICD-10-CM

## 2024-04-22 DIAGNOSIS — F41.0 GENERALIZED ANXIETY DISORDER WITH PANIC ATTACKS: ICD-10-CM

## 2024-04-22 DIAGNOSIS — R41.89 COGNITIVE CHANGES: Primary | ICD-10-CM

## 2024-04-22 PROCEDURE — G2211 COMPLEX E/M VISIT ADD ON: HCPCS | Performed by: PSYCHIATRY & NEUROLOGY

## 2024-04-22 PROCEDURE — 3079F DIAST BP 80-89 MM HG: CPT | Performed by: PSYCHIATRY & NEUROLOGY

## 2024-04-22 PROCEDURE — 99205 OFFICE O/P NEW HI 60 MIN: CPT | Performed by: PSYCHIATRY & NEUROLOGY

## 2024-04-22 PROCEDURE — 99215 OFFICE O/P EST HI 40 MIN: CPT | Performed by: PSYCHIATRY & NEUROLOGY

## 2024-04-22 PROCEDURE — 3074F SYST BP LT 130 MM HG: CPT | Performed by: PSYCHIATRY & NEUROLOGY

## 2024-04-22 PROCEDURE — 1036F TOBACCO NON-USER: CPT | Performed by: PSYCHIATRY & NEUROLOGY

## 2024-04-22 RX ORDER — LORAZEPAM 0.5 MG/1
TABLET ORAL
Qty: 1 TABLET | Refills: 0 | Status: SHIPPED | OUTPATIENT
Start: 2024-04-22

## 2024-04-22 RX ORDER — ERGOCALCIFEROL 1.25 MG/1
1.25 CAPSULE ORAL
COMMUNITY

## 2024-04-22 ASSESSMENT — PATIENT HEALTH QUESTIONNAIRE - PHQ9
3. TROUBLE FALLING OR STAYING ASLEEP OR SLEEPING TOO MUCH: NEARLY EVERY DAY
6. FEELING BAD ABOUT YOURSELF - OR THAT YOU ARE A FAILURE OR HAVE LET YOURSELF OR YOUR FAMILY DOWN: SEVERAL DAYS
4. FEELING TIRED OR HAVING LITTLE ENERGY: NEARLY EVERY DAY
8. MOVING OR SPEAKING SO SLOWLY THAT OTHER PEOPLE COULD HAVE NOTICED. OR THE OPPOSITE, BEING SO FIGETY OR RESTLESS THAT YOU HAVE BEEN MOVING AROUND A LOT MORE THAN USUAL: MORE THAN HALF THE DAYS
7. TROUBLE CONCENTRATING ON THINGS, SUCH AS READING THE NEWSPAPER OR WATCHING TELEVISION: NEARLY EVERY DAY
1. LITTLE INTEREST OR PLEASURE IN DOING THINGS: SEVERAL DAYS
5. POOR APPETITE OR OVEREATING: NEARLY EVERY DAY
10. IF YOU CHECKED OFF ANY PROBLEMS, HOW DIFFICULT HAVE THESE PROBLEMS MADE IT FOR YOU TO DO YOUR WORK, TAKE CARE OF THINGS AT HOME, OR GET ALONG WITH OTHER PEOPLE: SOMEWHAT DIFFICULT
SUM OF ALL RESPONSES TO PHQ9 QUESTIONS 1 AND 2: 4
SUM OF ALL RESPONSES TO PHQ QUESTIONS 1-9: 20
9. THOUGHTS THAT YOU WOULD BE BETTER OFF DEAD, OR OF HURTING YOURSELF: SEVERAL DAYS
2. FEELING DOWN, DEPRESSED OR HOPELESS: NEARLY EVERY DAY

## 2024-04-22 ASSESSMENT — ENCOUNTER SYMPTOMS
LOSS OF SENSATION IN FEET: 0
OCCASIONAL FEELINGS OF UNSTEADINESS: 1
DEPRESSION: 1

## 2024-04-22 NOTE — LETTER
"April 24, 2024     Masha Wallace DO  [address]  [facility]  [facility] [address]    Patient: Alondra Magaña   YOB: 1960   Date of Visit: 4/22/2024       Dear Dr. Masha Wallace DO:    Thank you for referring Alondra Magaña to me for evaluation. Below are my notes for this consultation.  If you have questions, please do not hesitate to call me. I look forward to following your patient along with you.       Sincerely,     Harmeet Hooks MD      CC: Maryann Cota, APRN-CNP  ______________________________________________________________________________________    In-clinic visit    Visit type: provider referral: Dr Wallace    PCP: Masha Wallace DO.    Subjective    Alondra Magaña is a 63 y.o. year old right handed female who presents with cognitive changes. Also concerned about intermittent twitching.    Patient is accompanied by spouse.     HPI    Says can't remember \"anything\". Forgets things. Years now, but worse in last 2 years. Recent things, can't remember. Been seeing psych NP Maryann Cota at [facility] for 3 years for depression/anxiety. Says anxiety/depression \"could be better\". On multiple psych meds currently. Thinks work stress is contributing. She already decided that she's stopping work and retiring.     She works as a wound care nurse. States she can't remember \"patient things\". She denies being in trouble at work. She states she is being dumped on with \"24 patients to see in 4 hours\". Driving, can't park straight. Not getting lost. Knows what she's doing in intersections. Lives with spouse, spouse cooks more as he is retired. Spouse has always paid for bills.    Was diabetic, somewhat better control now. B12 2021 ok. Lipid panel borderline high LDL. TSH wnl.    No prior brain imaging.    States also intermittently RUE and head might jerk. At least once a day. Spouse adds it tends to happen more when she thinks about something stressful. When writing, cursive is not " "well read. \"Sloppy\" handwriting.    Balance not good. Trips. No falls.    No stroke. No sz. On lamotrigine for psych.    Was on Abilify at some point. Was on prozac. Never been on olanzapine/risperidone.    No smoking. No alcohol. No street drug use.      Review of Systems   Constitutional:  Negative for appetite change, chills and fever.   HENT:  Negative for ear pain and nosebleeds.    Eyes:  Negative for discharge and itching.   Respiratory:  Negative for choking and chest tightness.    Cardiovascular:  Negative for chest pain and palpitations.   Gastrointestinal:  Negative for abdominal distention, abdominal pain and nausea.   Endocrine: Negative for cold intolerance and heat intolerance.   Genitourinary:  Negative for difficulty urinating and dysuria.   Musculoskeletal:  Negative for gait problem and myalgias.   Neurological:  Negative for dizziness, seizures and numbness.     Patient Active Problem List   Diagnosis   • Generalized anxiety disorder with panic attacks   • Migraine headache   • Chronic insomnia   • Controlled type 2 diabetes mellitus without complication, without long-term current use of insulin (Multi)   • Neck pain   • Obstructive sleep apnea   • Status post bariatric surgery   • Vitamin D deficiency   • Shellfish allergy   • Brain fog   • Combined forms of age-related cataract of both eyes   • Dermatochalasis of both upper eyelids   • Dry eye syndrome of bilateral lacrimal glands   • Esophageal reflux   • Major depressive disorder, recurrent episode, moderate (Multi)   • Class 2 severe obesity due to excess calories with serious comorbidity and body mass index (BMI) of 36.0 to 36.9 in adult (Multi)   • Myopia of both eyes with astigmatism and presbyopia   • PVD (posterior vitreous detachment), bilateral   • Menopausal and female climacteric states   • Benign neoplasm of colon   • Blurred vision, bilateral   • PONV (postoperative nausea and vomiting)       Past Medical History:   Diagnosis Date "   • Fracture of unspecified part of left clavicle, initial encounter for closed fracture 01/21/2019    Fracture of left clavicle   • Personal history of other diseases of the respiratory system 01/18/2017    History of bronchitis   • Sleep apnea    • Type 2 diabetes mellitus with hyperglycemia (Multi) 08/18/2020    Uncontrolled type 2 diabetes mellitus with hyperglycemia     Past Surgical History:   Procedure Laterality Date   • OTHER SURGICAL HISTORY  11/19/2020    Cholecystectomy   • OTHER SURGICAL HISTORY  11/19/2020    Bariatric surgery   • OTHER SURGICAL HISTORY  11/19/2020    Open reduction-internal fixation     Social History     Tobacco Use   • Smoking status: Never   • Smokeless tobacco: Never   Substance Use Topics   • Alcohol use: Never     family history includes Colon cancer in her maternal grandfather.    Allergies   Allergen Reactions   • Nsaids (Non-Steroidal Anti-Inflammatory Drug) Other     Gastric Bypass    Other reaction(s): Contraindication-Medical Surgical, Other   Gastric Bypass   Gastric Bypass   • Shellfish Containing Products Unknown   • Shellfish Derived Other       Current Outpatient Medications:   •  Accu-Chek Guide test strips strip, , Disp: , Rfl:   •  Accu-Chek Softclix Lancets misc, , Disp: , Rfl:   •  butalbital-acetaminophen-caff (Fioricet) -40 mg capsule, Take 1 capsule by mouth once daily as needed for headaches (not to exceed 3 doses in 7 days due to risk of rebound headache)., Disp: 12 capsule, Rfl: 2  •  cariprazine (Vraylar) 1.5 mg capsule, Take 1 capsule (1.5 mg) by mouth once daily., Disp: , Rfl:   •  clorazepate (Tranxene) 3.75 mg tablet, Take 1 tablet (3.75 mg) by mouth 2 times a day as needed., Disp: , Rfl:   •  ergocalciferol (Vitamin D-2) 1.25 MG (90128 UT) capsule, Take 1 capsule (1,250 mcg) by mouth 1 (one) time per week., Disp: , Rfl:   •  lamoTRIgine (LaMICtal) 100 mg tablet, Take 1 tablet (100 mg) by mouth 2 times a day. 100mg  mg PM, Disp: , Rfl:   •   SUMAtriptan (Imitrex) 6 mg/0.5 mL injection, Inject 0.5 mL (6 mg) under the skin if needed., Disp: , Rfl:   •  tirzepatide (Mounjaro) 5 mg/0.5 mL pen injector, Inject 5 mg under the skin 1 (one) time per week., Disp: 2 mL, Rfl: 11  •  traZODone (Desyrel) 100 mg tablet, Take 1 tablet (100 mg) by mouth as needed at bedtime for sleep., Disp: 90 tablet, Rfl: 3  •  venlafaxine (Effexor) 50 mg tablet, Take 1 tablet (50 mg) by mouth 2 times a day. (Patient taking differently: Take 2 tablets (100 mg) by mouth 2 times a day.), Disp: 180 tablet, Rfl: 3  •  clorazepate (Tranxene) 3.75 mg tablet, Take 1 tablet (3.75 mg) by mouth 2 times a day as needed for anxiety., Disp: 60 tablet, Rfl: 0    Objective    /84   Pulse 76   Wt 98.9 kg (218 lb)   BMI 37.42 kg/m²     Spiral test normal on 4/22/24  Handwriting decent on 4/22/24  SLUMS 21/30 on 4/22/24    Awake, alert, oriented x3, in no distress  Well-nourished, ambulatory independently  No leg edema    (-) bradykinesia  (-) masked facies  Poor eye contact    Mental status exam as above, conversant   Fair fund of knowledge  Recent/remote memory fair  Fair attention span  Pupils round reactive to light, 3-4 mm, (-) RAPD   Fundoscopic examination was attempted but fundus was not visualized bilaterally   Full EOMs intact, no nystagmus, no ptosis   V1 to V3 sensation is intact   No facial droop   Hearing grossly intact   No dysarthria  Good shoulder shrug bilaterally   Tongue is midline     Motor strength is 5/5 on all extremities, tone/bulk normal   No resting/postural/intention tremor  No head tremor  Reflexes 1+ on all 4 extremities, downgoing toes bilaterally   Sensation is intact to light touch, vibration on all 4 extremities   Finger to nose test intact bilaterally   Negative Romberg sign   Normal gait       Lab Results   Component Value Date    WBC 7.1 12/06/2023    RBC 4.56 12/06/2023    HGB 13.8 12/06/2023    HCT 42.4 12/06/2023     12/06/2023      12/06/2023    K 4.7 12/06/2023     12/06/2023    BUN 9 12/06/2023    CREATININE 0.91 12/06/2023    EGFR 71 12/06/2023    CALCIUM 9.2 12/06/2023    ALKPHOS 78 12/06/2023    AST 19 12/06/2023    ALT 27 12/06/2023    YQPUURJI40 883 12/08/2021    VITD25 41 12/02/2022    HGBA1C 5.9 (H) 12/06/2023    LDLCALC 108 (H) 12/06/2023    CHOL 198 12/06/2023    HDL 70.7 12/06/2023    TRIG 95 12/06/2023    TSH 0.56 06/02/2023      No head imaging      Assessment/Plan    Cognitive changes  AMAN  Major depressive disorder  Multiple possibilities: MCI (? Vascular vs other) vs mood disorders vs med side effects vs other  B12/TSH ok--s/p gastric bypass surgery  SLUMS 21/30 on 4/22/24  Discussed  Seeing psychiatry NP    Polypharmacy    Muscle twitch  I see no abnormal movements and no parkinsonian signs today on exam      Plans:  Check B12 level  Do MRI brain wo  Do neuropsych testing  I don't see natalie parkinsonian symptoms or abn movements at this time    Rtc after testing    All questions were answered.  Pt knows how to contact my office in case pt has any questions or concerns.    Harmeet Hooks MD

## 2024-04-22 NOTE — PATIENT INSTRUCTIONS
Check B12  Do MRI brain wo  Do neuropsych testing  I don't see natalie parkinsonian symptoms or abn movements at this time    Rtc after testing

## 2024-04-22 NOTE — PROGRESS NOTES
"In-clinic visit    Visit type: provider referral: Dr Wallace    PCP: Masah Wallace DO.    Subjective     Alondra Magaña is a 63 y.o. year old right handed female who presents with cognitive changes. Also concerned about intermittent twitching.    Patient is accompanied by spouse.     HPI    Says can't remember \"anything\". Forgets things. Years now, but worse in last 2 years. Recent things, can't remember. Been seeing psych NP Maryann Cipriano at [facility] for 3 years for depression/anxiety. Says anxiety/depression \"could be better\". On multiple psych meds currently. Thinks work stress is contributing. She already decided that she's stopping work and retiring.     She works as a wound care nurse. States she can't remember \"patient things\". She denies being in trouble at work. She states she is being dumped on with \"24 patients to see in 4 hours\". Driving, can't park straight. Not getting lost. Knows what she's doing in intersections. Lives with spouse, spouse cooks more as he is retired. Spouse has always paid for bills.    Was diabetic, somewhat better control now. B12 2021 ok. Lipid panel borderline high LDL. TSH wnl.    No prior brain imaging.    States also intermittently RUE and head might jerk. At least once a day. Spouse adds it tends to happen more when she thinks about something stressful. When writing, cursive is not well read. \"Sloppy\" handwriting.    Balance not good. Trips. No falls.    No stroke. No sz. On lamotrigine for psych.    Was on Abilify at some point. Was on prozac. Never been on olanzapine/risperidone.    No smoking. No alcohol. No street drug use.      Review of Systems   Constitutional:  Negative for appetite change, chills and fever.   HENT:  Negative for ear pain and nosebleeds.    Eyes:  Negative for discharge and itching.   Respiratory:  Negative for choking and chest tightness.    Cardiovascular:  Negative for chest pain and palpitations.   Gastrointestinal:  Negative for abdominal " distention, abdominal pain and nausea.   Endocrine: Negative for cold intolerance and heat intolerance.   Genitourinary:  Negative for difficulty urinating and dysuria.   Musculoskeletal:  Negative for gait problem and myalgias.   Neurological:  Negative for dizziness, seizures and numbness.     Patient Active Problem List   Diagnosis    Generalized anxiety disorder with panic attacks    Migraine headache    Chronic insomnia    Controlled type 2 diabetes mellitus without complication, without long-term current use of insulin (Multi)    Neck pain    Obstructive sleep apnea    Status post bariatric surgery    Vitamin D deficiency    Shellfish allergy    Brain fog    Combined forms of age-related cataract of both eyes    Dermatochalasis of both upper eyelids    Dry eye syndrome of bilateral lacrimal glands    Esophageal reflux    Major depressive disorder, recurrent episode, moderate (Multi)    Class 2 severe obesity due to excess calories with serious comorbidity and body mass index (BMI) of 36.0 to 36.9 in adult (Multi)    Myopia of both eyes with astigmatism and presbyopia    PVD (posterior vitreous detachment), bilateral    Menopausal and female climacteric states    Benign neoplasm of colon    Blurred vision, bilateral    PONV (postoperative nausea and vomiting)       Past Medical History:   Diagnosis Date    Fracture of unspecified part of left clavicle, initial encounter for closed fracture 01/21/2019    Fracture of left clavicle    Personal history of other diseases of the respiratory system 01/18/2017    History of bronchitis    Sleep apnea     Type 2 diabetes mellitus with hyperglycemia (Multi) 08/18/2020    Uncontrolled type 2 diabetes mellitus with hyperglycemia     Past Surgical History:   Procedure Laterality Date    OTHER SURGICAL HISTORY  11/19/2020    Cholecystectomy    OTHER SURGICAL HISTORY  11/19/2020    Bariatric surgery    OTHER SURGICAL HISTORY  11/19/2020    Open reduction-internal fixation      Social History     Tobacco Use    Smoking status: Never    Smokeless tobacco: Never   Substance Use Topics    Alcohol use: Never     family history includes Colon cancer in her maternal grandfather.    Allergies   Allergen Reactions    Nsaids (Non-Steroidal Anti-Inflammatory Drug) Other     Gastric Bypass    Other reaction(s): Contraindication-Medical Surgical, Other   Gastric Bypass   Gastric Bypass    Shellfish Containing Products Unknown    Shellfish Derived Other       Current Outpatient Medications:     Accu-Chek Guide test strips strip, , Disp: , Rfl:     Accu-Chek Softclix Lancets misc, , Disp: , Rfl:     butalbital-acetaminophen-caff (Fioricet) -40 mg capsule, Take 1 capsule by mouth once daily as needed for headaches (not to exceed 3 doses in 7 days due to risk of rebound headache)., Disp: 12 capsule, Rfl: 2    cariprazine (Vraylar) 1.5 mg capsule, Take 1 capsule (1.5 mg) by mouth once daily., Disp: , Rfl:     clorazepate (Tranxene) 3.75 mg tablet, Take 1 tablet (3.75 mg) by mouth 2 times a day as needed., Disp: , Rfl:     ergocalciferol (Vitamin D-2) 1.25 MG (87243 UT) capsule, Take 1 capsule (1,250 mcg) by mouth 1 (one) time per week., Disp: , Rfl:     lamoTRIgine (LaMICtal) 100 mg tablet, Take 1 tablet (100 mg) by mouth 2 times a day. 100mg  mg PM, Disp: , Rfl:     SUMAtriptan (Imitrex) 6 mg/0.5 mL injection, Inject 0.5 mL (6 mg) under the skin if needed., Disp: , Rfl:     tirzepatide (Mounjaro) 5 mg/0.5 mL pen injector, Inject 5 mg under the skin 1 (one) time per week., Disp: 2 mL, Rfl: 11    traZODone (Desyrel) 100 mg tablet, Take 1 tablet (100 mg) by mouth as needed at bedtime for sleep., Disp: 90 tablet, Rfl: 3    venlafaxine (Effexor) 50 mg tablet, Take 1 tablet (50 mg) by mouth 2 times a day. (Patient taking differently: Take 2 tablets (100 mg) by mouth 2 times a day.), Disp: 180 tablet, Rfl: 3    clorazepate (Tranxene) 3.75 mg tablet, Take 1 tablet (3.75 mg) by mouth 2 times a day  as needed for anxiety., Disp: 60 tablet, Rfl: 0    Objective     /84   Pulse 76   Wt 98.9 kg (218 lb)   BMI 37.42 kg/m²     Spiral test normal on 4/22/24  Handwriting decent on 4/22/24  SLUMS 21/30 on 4/22/24    Awake, alert, oriented x3, in no distress  Well-nourished, ambulatory independently  No leg edema    (-) bradykinesia  (-) masked facies  Poor eye contact    Mental status exam as above, conversant   Fair fund of knowledge  Recent/remote memory fair  Fair attention span  Pupils round reactive to light, 3-4 mm, (-) RAPD   Fundoscopic examination was attempted but fundus was not visualized bilaterally   Full EOMs intact, no nystagmus, no ptosis   V1 to V3 sensation is intact   No facial droop   Hearing grossly intact   No dysarthria  Good shoulder shrug bilaterally   Tongue is midline     Motor strength is 5/5 on all extremities, tone/bulk normal   No resting/postural/intention tremor  No head tremor  Reflexes 1+ on all 4 extremities, downgoing toes bilaterally   Sensation is intact to light touch, vibration on all 4 extremities   Finger to nose test intact bilaterally   Negative Romberg sign   Normal gait       Lab Results   Component Value Date    WBC 7.1 12/06/2023    RBC 4.56 12/06/2023    HGB 13.8 12/06/2023    HCT 42.4 12/06/2023     12/06/2023     12/06/2023    K 4.7 12/06/2023     12/06/2023    BUN 9 12/06/2023    CREATININE 0.91 12/06/2023    EGFR 71 12/06/2023    CALCIUM 9.2 12/06/2023    ALKPHOS 78 12/06/2023    AST 19 12/06/2023    ALT 27 12/06/2023    NYNOZPXY00 883 12/08/2021    VITD25 41 12/02/2022    HGBA1C 5.9 (H) 12/06/2023    LDLCALC 108 (H) 12/06/2023    CHOL 198 12/06/2023    HDL 70.7 12/06/2023    TRIG 95 12/06/2023    TSH 0.56 06/02/2023      No head imaging      Assessment/Plan     Cognitive changes  AMAN  Major depressive disorder  Multiple possibilities: MCI (? Vascular vs other) vs mood disorders vs med side effects vs other  B12/TSH ok--s/p gastric bypass  surgery  SLUMS 21/30 on 4/22/24  Discussed  Seeing psychiatry NP    Polypharmacy    Muscle twitch  I see no abnormal movements and no parkinsonian signs today on exam      Plans:  Check B12 level  Do MRI brain wo  Do neuropsych testing  I don't see natalie parkinsonian symptoms or abn movements at this time    Rtc after testing    All questions were answered.  Pt knows how to contact my office in case pt has any questions or concerns.    Harmeet Hooks MD

## 2024-05-09 ENCOUNTER — HOSPITAL ENCOUNTER (OUTPATIENT)
Dept: RADIOLOGY | Facility: CLINIC | Age: 64
Discharge: HOME | End: 2024-05-09
Payer: COMMERCIAL

## 2024-05-09 DIAGNOSIS — R41.89 COGNITIVE CHANGES: ICD-10-CM

## 2024-05-09 PROCEDURE — 70551 MRI BRAIN STEM W/O DYE: CPT

## 2024-05-09 PROCEDURE — 70551 MRI BRAIN STEM W/O DYE: CPT | Performed by: RADIOLOGY

## 2024-05-10 DIAGNOSIS — E34.8 PINEAL GLAND CYST: Primary | ICD-10-CM

## 2024-05-13 PROCEDURE — RXMED WILLOW AMBULATORY MEDICATION CHARGE

## 2024-05-17 ENCOUNTER — PHARMACY VISIT (OUTPATIENT)
Dept: PHARMACY | Facility: CLINIC | Age: 64
End: 2024-05-17
Payer: COMMERCIAL

## 2024-05-31 ENCOUNTER — OFFICE VISIT (OUTPATIENT)
Dept: PRIMARY CARE | Facility: CLINIC | Age: 64
End: 2024-05-31
Payer: COMMERCIAL

## 2024-05-31 VITALS
DIASTOLIC BLOOD PRESSURE: 74 MMHG | BODY MASS INDEX: 36.88 KG/M2 | HEART RATE: 78 BPM | SYSTOLIC BLOOD PRESSURE: 119 MMHG | WEIGHT: 216 LBS | HEIGHT: 64 IN | OXYGEN SATURATION: 99 % | RESPIRATION RATE: 18 BRPM | TEMPERATURE: 96.8 F

## 2024-05-31 DIAGNOSIS — N30.01 ACUTE CYSTITIS WITH HEMATURIA: ICD-10-CM

## 2024-05-31 DIAGNOSIS — R32 URINARY INCONTINENCE, UNSPECIFIED TYPE: Primary | ICD-10-CM

## 2024-05-31 LAB
POC APPEARANCE, URINE: ABNORMAL
POC BILIRUBIN, URINE: ABNORMAL
POC BLOOD, URINE: ABNORMAL
POC COLOR, URINE: ABNORMAL
POC GLUCOSE, URINE: NEGATIVE MG/DL
POC KETONES, URINE: ABNORMAL MG/DL
POC LEUKOCYTES, URINE: ABNORMAL
POC NITRITE,URINE: NEGATIVE
POC PH, URINE: 5 PH
POC PROTEIN, URINE: ABNORMAL MG/DL
POC SPECIFIC GRAVITY, URINE: 1.01
POC UROBILINOGEN, URINE: 0.2 EU/DL

## 2024-05-31 PROCEDURE — 81002 URINALYSIS NONAUTO W/O SCOPE: CPT

## 2024-05-31 PROCEDURE — 1036F TOBACCO NON-USER: CPT

## 2024-05-31 PROCEDURE — 87086 URINE CULTURE/COLONY COUNT: CPT

## 2024-05-31 PROCEDURE — 3074F SYST BP LT 130 MM HG: CPT

## 2024-05-31 PROCEDURE — 3078F DIAST BP <80 MM HG: CPT

## 2024-05-31 PROCEDURE — 99213 OFFICE O/P EST LOW 20 MIN: CPT

## 2024-05-31 RX ORDER — ESTRADIOL 0.1 MG/G
2 CREAM VAGINAL DAILY
Qty: 42.5 G | Refills: 2 | Status: SHIPPED | OUTPATIENT
Start: 2024-05-31 | End: 2024-08-03

## 2024-05-31 RX ORDER — NITROFURANTOIN 25; 75 MG/1; MG/1
100 CAPSULE ORAL 2 TIMES DAILY
Qty: 14 CAPSULE | Refills: 0 | Status: SHIPPED | OUTPATIENT
Start: 2024-05-31 | End: 2024-06-07

## 2024-05-31 SDOH — ECONOMIC STABILITY: FOOD INSECURITY: WITHIN THE PAST 12 MONTHS, YOU WORRIED THAT YOUR FOOD WOULD RUN OUT BEFORE YOU GOT MONEY TO BUY MORE.: NEVER TRUE

## 2024-05-31 SDOH — ECONOMIC STABILITY: FOOD INSECURITY: WITHIN THE PAST 12 MONTHS, THE FOOD YOU BOUGHT JUST DIDN'T LAST AND YOU DIDN'T HAVE MONEY TO GET MORE.: NEVER TRUE

## 2024-05-31 ASSESSMENT — ENCOUNTER SYMPTOMS
DEPRESSION: 1
EYES NEGATIVE: 1
CARDIOVASCULAR NEGATIVE: 1
PSYCHIATRIC NEGATIVE: 1
CONSTITUTIONAL NEGATIVE: 1
LOSS OF SENSATION IN FEET: 0
MUSCULOSKELETAL NEGATIVE: 1
RESPIRATORY NEGATIVE: 1
OCCASIONAL FEELINGS OF UNSTEADINESS: 0
GASTROINTESTINAL NEGATIVE: 1
NEUROLOGICAL NEGATIVE: 1
ENDOCRINE NEGATIVE: 1
ALLERGIC/IMMUNOLOGIC NEGATIVE: 1
HEMATOLOGIC/LYMPHATIC NEGATIVE: 1

## 2024-05-31 ASSESSMENT — LIFESTYLE VARIABLES
HOW OFTEN DO YOU HAVE SIX OR MORE DRINKS ON ONE OCCASION: NEVER
SKIP TO QUESTIONS 9-10: 1
AUDIT-C TOTAL SCORE: 0
HOW MANY STANDARD DRINKS CONTAINING ALCOHOL DO YOU HAVE ON A TYPICAL DAY: PATIENT DOES NOT DRINK
HOW OFTEN DO YOU HAVE A DRINK CONTAINING ALCOHOL: NEVER

## 2024-05-31 ASSESSMENT — PAIN SCALES - GENERAL: PAINLEVEL: 0-NO PAIN

## 2024-05-31 NOTE — PROGRESS NOTES
"Subjective   Patient ID: Alondra Magaña is a 63 y.o. female who presents for Urinary Incontinence.    HPI   Patient presents with urinary incontinence over the last several months. She does endorse emptying her bladder when she urinates, but she is unable to hold her urine if she is not close to a bathroom. She has been using pads to catch the urine. 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 Her UA also came back + for UTI- macrobid sent to pharmacy. She denies any back pain/injury, abdominal pain, saddle paraesthesia, or bowel incontinence.    Review of Systems   Constitutional: Negative.    HENT: Negative.     Eyes: Negative.    Respiratory: Negative.     Cardiovascular: Negative.    Gastrointestinal: Negative.    Endocrine: Negative.    Genitourinary:  Positive for urgency.        Urinary incontinence   Musculoskeletal: Negative.    Skin: Negative.    Allergic/Immunologic: Negative.    Neurological: Negative.    Hematological: Negative.    Psychiatric/Behavioral: Negative.         Objective   /74 (BP Location: Left arm, Patient Position: Sitting, BP Cuff Size: Adult long)   Pulse 78   Temp 36 °C (96.8 °F) (Temporal)   Resp 18   Ht 1.626 m (5' 4\")   Wt 98 kg (216 lb)   SpO2 99%   BMI 37.08 kg/m²     Physical Exam  Constitutional:       Appearance: Normal appearance.   HENT:      Head: Normocephalic and atraumatic.   Cardiovascular:      Rate and Rhythm: Normal rate and regular rhythm.      Pulses: Normal pulses.      Heart sounds: Normal heart sounds.   Pulmonary:      Effort: Pulmonary effort is normal.      Breath sounds: Normal breath sounds.   Musculoskeletal:         General: Normal range of motion.   Skin:     General: Skin is warm and dry.   Neurological:      General: No focal deficit present.      Mental Status: She is alert and oriented to person, place, and time.         Assessment/Plan   Problem List Items Addressed This Visit  "   None  Visit Diagnoses         Codes    Urinary incontinence, unspecified type    -  Primary R32    Relevant Medications    estradiol (Estrace) 0.01 % (0.1 mg/gram) vaginal cream    Other Relevant Orders    POCT UA (nonautomated) manually resulted (Completed)    Urine Culture    Acute cystitis with hematuria     N30.01    Relevant Medications    nitrofurantoin, macrocrystal-monohydrate, (Macrobid) 100 mg capsule

## 2024-06-02 LAB — BACTERIA UR CULT: NORMAL

## 2024-06-06 ENCOUNTER — OFFICE VISIT (OUTPATIENT)
Dept: NEUROSURGERY | Facility: CLINIC | Age: 64
End: 2024-06-06

## 2024-06-06 VITALS
TEMPERATURE: 96.2 F | HEART RATE: 79 BPM | DIASTOLIC BLOOD PRESSURE: 74 MMHG | HEIGHT: 65 IN | WEIGHT: 217 LBS | BODY MASS INDEX: 36.15 KG/M2 | SYSTOLIC BLOOD PRESSURE: 106 MMHG

## 2024-06-06 DIAGNOSIS — E34.8 PINEAL GLAND CYST: ICD-10-CM

## 2024-06-06 PROCEDURE — 3078F DIAST BP <80 MM HG: CPT | Performed by: NURSE PRACTITIONER

## 2024-06-06 PROCEDURE — 99212 OFFICE O/P EST SF 10 MIN: CPT | Performed by: NURSE PRACTITIONER

## 2024-06-06 PROCEDURE — 99202 OFFICE O/P NEW SF 15 MIN: CPT | Performed by: NURSE PRACTITIONER

## 2024-06-06 PROCEDURE — 3074F SYST BP LT 130 MM HG: CPT | Performed by: NURSE PRACTITIONER

## 2024-06-06 ASSESSMENT — PAIN SCALES - GENERAL: PAINLEVEL: 0-NO PAIN

## 2024-06-06 NOTE — PROGRESS NOTES
"[facility]  Neurosurgery    History of Present Illness      Alondra Magaña is a 63-year-old female with a PMH significant for CANDIDO, DMT2, GERD, AMAN, insomnia, s/p bariatric surgery, who presented for evaluation with neurology for ongoing brain fog and forgetfulness that has been ongoing for the last 3 years. 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 She was ordered a MRI brain as well as recommended neuro psych testing. MRI brain with an incidental finding for 1.5 cm pineal cyst. Given findings patient was referred to neurosurgery and presents to clinic for NPV.     Involuntary movements, clumsy, tripping, and falling for the last 6 months. Endorses to back pain the last month. Ongoing memory concerns. Arm and head jerking. Notices these movements with increased stress and anxeity. History of migraine headaches last 3 years ago.     Retired APRN - wound care. Nonsmoker. No illicit drug use.         Objective      Vitals:   /74   Pulse 79   Temp 35.7 °C (96.2 °F)   Ht 1.651 m (5' 5\")   Wt 98.4 kg (217 lb)   BMI 36.11 kg/m²         Physical Exam:    A&Ox3   Fluent speech ; conversational able to recall medical history   EOMI; Fcx4   CATES: strength 5/5; no drift   Face and shoulder shrug symmetrical   Tongue midline   SILT   Gait normal       Relevant Results:    MRI Brain w/wo 05/09/24 with 1.5cm T2 hyperintense focus without enhancement in the pineal region consistent with minimally complex pineal cyst. No evidence of hydrocephalus.           Assessment & Plan      Diagnosis:  Diagnoses and all orders for this visit:  Pineal gland cyst  -     Referral to Neurosurgery  -     MR brain w and wo IV contrast; Future          Provider Impression:     Patient is a 63-year-old female presenting for initial evaluation after undergoing MRI imaging for workup of memory / brain fog where she was incidentally found " "to have a 1.5cm pineal gland cyst.     I discussed the natural history of pineal cysts, and also discussed that the pineal cyst is not related to her memory loss, but an incidental finding. She is not symptomatic from the pineal cyst. I discussed with the patient a pivotal paper published by Regina et al (J Neurosurg. 2011 Dec;115(6):1106-14. doi: 10.3171/2011.6.YIK03992. Epub 2011 Jul 22.) on the natural history and incidence of pineal cysts:    \"Pineal cysts measuring 5 mm or larger in greatest dimension were found in 478 patients (1.0%). Of these, 162 patients were male and 316 were female. On follow-up MR imaging of 151 patients with pineal cyst at a mean interval of 3.4 years from the initial study, 124 pineal cysts remained stable, 4 increased in size, and 23 decreased in size. Cysts that were larger at the time of initial diagnosis were more likely to decrease in size over the follow-up interval (p = 0.004). Patient sex, patient age at diagnosis, and the presence of septations within the cyst were not significantly associated with cyst change on follow-up\"    Therefore, majority of pineal cysts remain stable or decrease in size over time. Will therefore, recommend MR imaging in 6 months time. If imaging is stable a repeat MRI in one year will be ordered. If that MRI is stable and patient remains asymptomatic no further follow up imaging is required.     Patient verbalized understanding and all of her questions were answered.     Medical History     Past Medical History:   Diagnosis Date    Fracture of unspecified part of left clavicle, initial encounter for closed fracture 01/21/2019    Fracture of left clavicle    Personal history of other diseases of the respiratory system 01/18/2017    History of bronchitis    Sleep apnea     Type 2 diabetes mellitus with hyperglycemia (Multi) 08/18/2020    Uncontrolled type 2 diabetes mellitus with hyperglycemia     Past Surgical History:   Procedure Laterality Date    " OTHER SURGICAL HISTORY  11/19/2020    Cholecystectomy    OTHER SURGICAL HISTORY  11/19/2020    Bariatric surgery    OTHER SURGICAL HISTORY  11/19/2020    Open reduction-internal fixation     Social History     Tobacco Use    Smoking status: Never    Smokeless tobacco: Never   Vaping Use    Vaping status: Never Used   Substance Use Topics    Alcohol use: Never    Drug use: Never     Family History   Problem Relation Name Age of Onset    Colon cancer Maternal Grandfather       Allergies   Allergen Reactions    Nsaids (Non-Steroidal Anti-Inflammatory Drug) Other     Gastric Bypass    Other reaction(s): Contraindication-Medical Surgical, Other   Gastric Bypass   Gastric Bypass    Shellfish Containing Products Unknown    Shellfish Derived Other     Current Outpatient Medications   Medication Instructions    Accu-Chek Guide test strips strip     Accu-Chek Softclix Lancets misc     cariprazine (Vraylar) 1.5 mg capsule Take 1 capsule (1.5 mg) by mouth once daily.    clorazepate (TRANXENE) 3.75 mg, oral, 2 times daily PRN    clorazepate (TRANXENE) 3.75 mg, oral, 2 times daily PRN    ergocalciferol (VITAMIN D-2) 1.25 mg, oral, Once Weekly    estradiol (ESTRACE) 2 g, vaginal, Daily, Apply vaginally once a day for 1 week, and then twice weekly thereafter    lamoTRIgine (LaMICtal) 100 mg tablet 1 tablet, oral, 2 times daily, 100mg AM<BR>100 mg PM    LORazepam (Ativan) 0.5 mg tablet Take 1 tab 15 min prior to MRI, needs  to and from    Mounjaro 5 mg, subcutaneous, Once Weekly    traZODone (DESYREL) 100 mg, oral, Nightly PRN    venlafaxine (EFFEXOR) 50 mg, oral, 2 times daily

## 2024-06-10 PROCEDURE — RXMED WILLOW AMBULATORY MEDICATION CHARGE

## 2024-06-11 ENCOUNTER — PHARMACY VISIT (OUTPATIENT)
Dept: PHARMACY | Facility: CLINIC | Age: 64
End: 2024-06-11
Payer: COMMERCIAL

## 2024-06-14 ENCOUNTER — APPOINTMENT (OUTPATIENT)
Dept: OPHTHALMOLOGY | Facility: CLINIC | Age: 64
End: 2024-06-14

## 2024-06-14 ENCOUNTER — PREP FOR PROCEDURE (OUTPATIENT)
Dept: OPHTHALMOLOGY | Facility: CLINIC | Age: 64
End: 2024-06-14

## 2024-06-14 DIAGNOSIS — E11.9 CONTROLLED TYPE 2 DIABETES MELLITUS WITHOUT COMPLICATION, WITHOUT LONG-TERM CURRENT USE OF INSULIN (MULTI): Chronic | ICD-10-CM

## 2024-06-14 DIAGNOSIS — H25.812 COMBINED FORMS OF AGE-RELATED CATARACT OF LEFT EYE: ICD-10-CM

## 2024-06-14 DIAGNOSIS — H25.811 COMBINED FORMS OF AGE-RELATED CATARACT OF RIGHT EYE: ICD-10-CM

## 2024-06-14 DIAGNOSIS — H25.813 COMBINED FORMS OF AGE-RELATED CATARACT OF BOTH EYES: Primary | Chronic | ICD-10-CM

## 2024-06-14 DIAGNOSIS — H52.7 REFRACTION ERROR: ICD-10-CM

## 2024-06-14 LAB
A LENGTH (OD): 23.15 MM
A LENGTH (OS): 23.04 MM
AC IOL (OD): 3.2 MM
AC IOL (OS): 3.25 MM
WHITE TO WHITE (OD): 11.94 MM
WHITE TO WHITE (OS): 11.88 MM

## 2024-06-14 ASSESSMENT — ENCOUNTER SYMPTOMS
NEUROLOGICAL NEGATIVE: 0
HEMATOLOGIC/LYMPHATIC NEGATIVE: 0
ENDOCRINE NEGATIVE: 0
RESPIRATORY NEGATIVE: 0
ALLERGIC/IMMUNOLOGIC NEGATIVE: 0
PSYCHIATRIC NEGATIVE: 0
CARDIOVASCULAR NEGATIVE: 0
CONSTITUTIONAL NEGATIVE: 0
MUSCULOSKELETAL NEGATIVE: 0
EYES NEGATIVE: 0
GASTROINTESTINAL NEGATIVE: 0

## 2024-06-14 ASSESSMENT — CONF VISUAL FIELD
OD_INFERIOR_NASAL_RESTRICTION: 0
OS_SUPERIOR_NASAL_RESTRICTION: 0
OS_INFERIOR_NASAL_RESTRICTION: 0
OS_SUPERIOR_TEMPORAL_RESTRICTION: 0
OD_SUPERIOR_NASAL_RESTRICTION: 0
OD_NORMAL: 1
OD_INFERIOR_TEMPORAL_RESTRICTION: 0
OD_SUPERIOR_TEMPORAL_RESTRICTION: 0
OS_INFERIOR_TEMPORAL_RESTRICTION: 0

## 2024-06-14 ASSESSMENT — EXTERNAL EXAM - RIGHT EYE: OD_EXAM: NORMAL

## 2024-06-14 ASSESSMENT — TONOMETRY
IOP_METHOD: GOLDMANN APPLANATION
OD_IOP_MMHG: 14
OS_IOP_MMHG: 17

## 2024-06-14 ASSESSMENT — CUP TO DISC RATIO
OS_RATIO: 0.3
OD_RATIO: 0.3

## 2024-06-14 ASSESSMENT — EXTERNAL EXAM - LEFT EYE: OS_EXAM: NORMAL

## 2024-06-14 ASSESSMENT — REFRACTION_WEARINGRX
OD_SPHERE: -1.25
OD_AXIS: 153
OS_SPHERE: -0.50
OD_CYLINDER: +0.75

## 2024-06-14 ASSESSMENT — SLIT LAMP EXAM - LIDS
COMMENTS: NORMAL
COMMENTS: NORMAL

## 2024-06-14 ASSESSMENT — REFRACTION_MANIFEST
OS_SPHERE: -0.50
OD_AXIS: 153
OS_CYLINDER: +0.50
OD_CYLINDER: +1.00
OD_SPHERE: -1.50

## 2024-06-14 ASSESSMENT — VISUAL ACUITY
OD_CC: 20/60
OS_CC: 20/50
METHOD: SNELLEN - LINEAR
CORRECTION_TYPE: GLASSES

## 2024-06-14 NOTE — ASSESSMENT & PLAN NOTE
Significant cataract noted on exam by vision OU. Will plan to move forward with surgery right eye (OD) first then left eye (OS). Our goal will be a distance target. We discussed the vision and eye issues associated with the cataract. The risks of surgery including infection, complications, additional surgery/therapy, the need to see other eye specialists, loss of vision, and loss of the eye were addressed as part of the decision making process. At this time the benefits of surgery outweigh the potential risks. The patient is aware of alternatives including not performing the surgery. The patient understands they will need a regimen of pre and post operative drops along with frequent post operative visits. Risks of noncompliance with medications and visits were discussed.     History of LASIK: no  Patient would prefer to schedule surgery at first available  PCP is Dr. Wallace  Patient would like drops sent to [facility] on [address] in [place]

## 2024-06-14 NOTE — PATIENT INSTRUCTIONS
Cataract Surgery Information  Valeriano Martinez MD  [phone]    Cataract surgery will be performed at the [facility] OR the [facility]  You will be contacted by our office with surgery days as they become available. Please note there may be significant wait time based on block availability  We will try our best to accommodate your preference in surgery center but insurance may require a different choice  [facility] is located at [facility] - [address] [place] [address] - [phone]  Your surgery time is not finalized until the day before surgery. Please call [phone] after 2:00 PM on the day before your surgery to confirm your arrival time  [facility] is located at [facility] - [address], [place] [id number] - [phone]    Pre-surgical evaluation requirements will be determined by which surgical center you are scheduled at:  For [facility] your pre-admission testing will be scheduled for you  Expect a phone call from our office with the date and time of this appointment  If for some reason this date/time does not work, please call [phone] to speak with [facility] scheduling  During your pre-admission testing visit you may or may not have:  A physical exam  Blood/Urine testing  X-rays  EKG (heart monitoring)  You do not need a  for your pre-admission appointment, though space is limited so please family/guests to 1 person  You will be asked personal questions, these are often required by the hospital or your insurance. Answers are confidential and for hospital/insurance use only  Please bring:  An up to date list of medications (or medication bottles) with dosages and frequency of each. This includes OTC medications/supplements  Insurance information/cards (even if you have provided this previously)  You may have your  appointment at [facility] or [facility] - [address] [place] [address]  Pre-admission testing [phone]  [facility] - [address], [place] [address]  Pre-admission testing [phone]  For [facility] you will need a history and physical with your primary care provider within 30 days of your surgery date  Where possible we will contact your PCP to inform them of this requirement but we will ask you to reach out to your provider's office to schedule  Failure to have a documented history and physical on file may result in delays scheduling your surgery  If your PCP is within the  system, your documentation will be available within the electronic record  We will send/fax a form to all other primary care providers to be faxed back to our office to meet documentation requirements  [facility] anesthesia will contact you to perform a pre-procedural review  This is in addition to the required history and physical  Should occur approximately 2 weeks prior to your surgery date  They may require additional testing including blood work or an EKG (heart monitoring)  They may ask for clearance from additional specialists including cardiology  They may have additional restrictions in regards to surgery beyond what is outlined below  You may be deemed inappropriate for surgery in that setting and we would work to get you scheduled at [facility]       You will be using eye drops BEFORE and AFTER your surgery.   Three drops will be prescribed to your pharmacy and will be available for  prior to your surgical date  Ciprofloxacin (ciloxan) and Ketorolac (acular) are to be used three times daily starting three days BEFORE surgery. Please do not dispose of these drops as you will use them AFTER surgery as well  Prednisolone (pred forte) is to be used starting AFTER surgery  You will be given new drop instructions after surgery  You  may re-use the same set of drops for the second eye if you are having both eyes operated on consecutively  You DO NOT need to use your eye drops on the morning of surgery  ALWAYS bring your eye drops with you to any post operative appointments    You will receive a post-op kit and new instructions the day after surgery    Please DO NOT eat or drink anything after midnight the night prior to surgery  You may take sips of clear liquids for taking any necessary medications (blood pressure, heart, breathing, thyroid, anti-seizure, Parkinsons) per your pre-admission testing instructions or PCP instructions  Your surgery could be cancelled for failure to adhere to these restrictions  We recommend avoiding a large meal the evening before surgery  No ALCOHOL for 24 hours prior to surgery or 24 hours post surgery    On the day of surgery:  No smoking that morning and avoid following your surgery  Take any prescribed inhalers and bring to the surgical center  Bring an up to date list of medications to the surgical center  Do not take Insulin or oral diabetic medications unless instructed otherwise by your PCP or by hospital staff at your preadmission testing. [facility] staff will be monitoring your blood sugar during your stay if this is your surgical center.  Avoid wearing makeup (including fingernail polish) or jewelry  You will be asked to remove glasses or contacts, please bring an appropriate case for storage  Partials or dentures are usually removed, please bring an appropriate case for storage  Leave any unnecessary valuables at home    If you develop cold/flu symptoms, sore throat, or fever prior to surgery, please notify our office    WE REQUIRE someone to drive you to and from the surgical center on the day of surgery AND the next day to our office for your 1 day post operative appointment

## 2024-06-14 NOTE — PROGRESS NOTES
Assessment/Plan   Problem List Items Addressed This Visit       Controlled type 2 diabetes mellitus without complication, without long-term current use of insulin (Multi) (Chronic)     Advised no signs of diabetic changes on exam. Recommend to continue best sugar control and on need for annual checkup. Understands role of good BP control and weight loss if applicable. Discussed some components of selected studies like WESDR, DCCT, and UKPDS to describe the likely course of diabetes and effects on the eyes.           Combined forms of age-related cataract of both eyes - Primary (Chronic)     Significant cataract noted on exam by vision OU. Will plan to move forward with surgery right eye (OD) first then left eye (OS). Our goal will be a distance target. We discussed the vision and eye issues associated with the cataract. The risks of surgery including infection, complications, additional surgery/therapy, the need to see other eye specialists, loss of vision, and loss of the eye were addressed as part of the decision making process. At this time the benefits of surgery outweigh the potential risks. The patient is aware of alternatives including not performing the surgery. The patient understands they will need a regimen of pre and post operative drops along with frequent post operative visits. Risks of noncompliance with medications and visits were discussed.     History of LASIK: no  Patient would prefer to schedule surgery at first available  PCP is Dr. Wallace  Patient would like drops sent to [facility] on [address] in [place]           Relevant Orders    OCT, Retina - OU - Both Eyes (Completed)    Ultrasound Biometry w IOL [place] - OU - Both Eyes (Completed)    Refraction error     Hold on RX as will be pursuing cataract extraction (CE)          Other Visit Diagnoses       Combined forms of age-related cataract of right eye        Relevant Orders    Case Request Operating Room: Extraction Cataract with Insertion  Intraocular Lens (Completed)    Combined forms of age-related cataract of left eye        Relevant Orders    Case Request Operating Room: Extraction Cataract with Insertion Intraocular Lens (Completed)            Provided reassurance regarding above diagnoses and care received in the office visit today. Discussed outcomes and options along with the importance of treatment compliance. Understands the importance of any follow up visits. Patient instructed to call/communicate with our office if any new issues, questions, or concerns.     Will plan to see back in post op or sooner PRN

## 2024-06-17 PROBLEM — H25.811 COMBINED FORMS OF AGE-RELATED CATARACT OF RIGHT EYE: Status: ACTIVE | Noted: 2024-06-14

## 2024-06-17 PROBLEM — H25.812 COMBINED FORMS OF AGE-RELATED CATARACT OF LEFT EYE: Status: ACTIVE | Noted: 2024-06-14

## 2024-06-20 ENCOUNTER — TELEPHONE (OUTPATIENT)
Dept: OPHTHALMOLOGY | Facility: CLINIC | Age: 64
End: 2024-06-20
Payer: COMMERCIAL

## 2024-06-20 NOTE — TELEPHONE ENCOUNTER
SPOKE WITH PT. POV 7/30/24 @ 8:30-IN OFFICE, POV 8/6/24 @ 1:30-IN OFFICE, POV 8/20/24 @ 1:30-IN OFFICE, POV 8/28/24 @ 1:30-IN OFFICE. DARRYN   
Detail Level: Detailed
Quality 130: Documentation Of Current Medications In The Medical Record: Current Medications Documented

## 2024-06-21 ENCOUNTER — OFFICE VISIT (OUTPATIENT)
Dept: PRIMARY CARE | Facility: CLINIC | Age: 64
End: 2024-06-21
Payer: COMMERCIAL

## 2024-06-21 VITALS
SYSTOLIC BLOOD PRESSURE: 143 MMHG | RESPIRATION RATE: 18 BRPM | OXYGEN SATURATION: 99 % | HEIGHT: 65 IN | WEIGHT: 218 LBS | TEMPERATURE: 96.8 F | BODY MASS INDEX: 36.32 KG/M2 | HEART RATE: 65 BPM | DIASTOLIC BLOOD PRESSURE: 87 MMHG

## 2024-06-21 DIAGNOSIS — L03.90 CELLULITIS, UNSPECIFIED CELLULITIS SITE: Primary | ICD-10-CM

## 2024-06-21 PROCEDURE — 3077F SYST BP >= 140 MM HG: CPT

## 2024-06-21 PROCEDURE — 3079F DIAST BP 80-89 MM HG: CPT

## 2024-06-21 PROCEDURE — 1036F TOBACCO NON-USER: CPT

## 2024-06-21 PROCEDURE — 99212 OFFICE O/P EST SF 10 MIN: CPT

## 2024-06-21 RX ORDER — SULFAMETHOXAZOLE AND TRIMETHOPRIM 800; 160 MG/1; MG/1
1 TABLET ORAL 2 TIMES DAILY
Qty: 20 TABLET | Refills: 0 | Status: SHIPPED | OUTPATIENT
Start: 2024-06-21 | End: 2024-07-01

## 2024-06-21 SDOH — ECONOMIC STABILITY: FOOD INSECURITY: WITHIN THE PAST 12 MONTHS, YOU WORRIED THAT YOUR FOOD WOULD RUN OUT BEFORE YOU GOT MONEY TO BUY MORE.: NEVER TRUE

## 2024-06-21 SDOH — ECONOMIC STABILITY: FOOD INSECURITY: WITHIN THE PAST 12 MONTHS, THE FOOD YOU BOUGHT JUST DIDN'T LAST AND YOU DIDN'T HAVE MONEY TO GET MORE.: NEVER TRUE

## 2024-06-21 ASSESSMENT — ENCOUNTER SYMPTOMS
CONSTITUTIONAL NEGATIVE: 1
ENDOCRINE NEGATIVE: 1
CARDIOVASCULAR NEGATIVE: 1
OCCASIONAL FEELINGS OF UNSTEADINESS: 0
MUSCULOSKELETAL NEGATIVE: 1
HEMATOLOGIC/LYMPHATIC NEGATIVE: 1
NEUROLOGICAL NEGATIVE: 1
DEPRESSION: 1
EYES NEGATIVE: 1
ALLERGIC/IMMUNOLOGIC NEGATIVE: 1
PSYCHIATRIC NEGATIVE: 1
RESPIRATORY NEGATIVE: 1
GASTROINTESTINAL NEGATIVE: 1
LOSS OF SENSATION IN FEET: 0

## 2024-06-21 ASSESSMENT — PAIN SCALES - GENERAL: PAINLEVEL: 0-NO PAIN

## 2024-06-21 NOTE — PROGRESS NOTES
"Subjective   Patient ID: Alondra Magaña is a 63 y.o. female who presents for Follow-up (Urinary incontinence ) and Breast Mass (Right side getting bigger).    HPI   Alondra presents as a follow up for urinary incontinence. She endorses noticing a difference with the topical estrogen, but is still experiencing the incontinence at times. She makes sure to go to the restroom every time she thinks of it and does Kegel exercises as well. She uses peripads during the night.  She has an area on her right breast that she states has been there for a long time, and every once in a while she is able to squeeze sebum out of it, but it is now red, hot, and tender to touch. It appears to be cellulitis- antibiotic send to pharmacy  She is having cataract surgery on one of her eyes in July and having it done on her other eye in August.     Review of Systems   Constitutional: Negative.    HENT: Negative.     Eyes: Negative.    Respiratory: Negative.     Cardiovascular: Negative.    Gastrointestinal: Negative.    Endocrine: Negative.    Genitourinary:         Urinary incontinence   Musculoskeletal: Negative.    Skin:         Red, hot, tender to palpation area on right breast   Allergic/Immunologic: Negative.    Neurological: Negative.    Hematological: Negative.    Psychiatric/Behavioral: Negative.         Objective   /87 (BP Location: Right arm, Patient Position: Sitting, BP Cuff Size: Adult)   Pulse 65   Temp 36 °C (96.8 °F) (Temporal)   Resp 18   Ht 1.651 m (5' 5\")   Wt 98.9 kg (218 lb)   SpO2 99%   BMI 36.28 kg/m²     Physical Exam  Constitutional:       Appearance: Normal appearance.   Cardiovascular:      Rate and Rhythm: Normal rate and regular rhythm.      Pulses: Normal pulses.      Heart sounds: Normal heart sounds.   Pulmonary:      Effort: Pulmonary effort is normal.      Breath sounds: Normal breath sounds.   Skin:     General: Skin is warm and dry.      Capillary Refill: Capillary refill takes less than 2 " seconds.      Findings: Erythema present.             Comments: No wound opening. Hot and tender to palpation   Neurological:      Mental Status: She is oriented to person, place, and time.   Psychiatric:         Mood and Affect: Mood normal.         Behavior: Behavior normal.         Thought Content: Thought content normal.         Judgment: Judgment normal.         Assessment/Plan   Problem List Items Addressed This Visit    None  Visit Diagnoses         Codes    Cellulitis, unspecified cellulitis site    -  Primary L03.90    Relevant Medications    sulfamethoxazole-trimethoprim (Bactrim DS) 800-160 mg tablet

## 2024-07-08 PROCEDURE — RXMED WILLOW AMBULATORY MEDICATION CHARGE

## 2024-07-11 ENCOUNTER — PHARMACY VISIT (OUTPATIENT)
Dept: PHARMACY | Facility: CLINIC | Age: 64
End: 2024-07-11
Payer: COMMERCIAL

## 2024-07-15 ENCOUNTER — APPOINTMENT (OUTPATIENT)
Dept: PRIMARY CARE | Facility: CLINIC | Age: 64
End: 2024-07-15
Payer: COMMERCIAL

## 2024-07-15 ENCOUNTER — LAB (OUTPATIENT)
Dept: LAB | Facility: LAB | Age: 64
End: 2024-07-15
Payer: COMMERCIAL

## 2024-07-15 VITALS
HEART RATE: 78 BPM | WEIGHT: 214.5 LBS | TEMPERATURE: 96.8 F | OXYGEN SATURATION: 98 % | RESPIRATION RATE: 20 BRPM | HEIGHT: 65 IN | DIASTOLIC BLOOD PRESSURE: 81 MMHG | SYSTOLIC BLOOD PRESSURE: 116 MMHG | BODY MASS INDEX: 35.74 KG/M2

## 2024-07-15 DIAGNOSIS — Z01.818 PRE-OP EVALUATION: ICD-10-CM

## 2024-07-15 DIAGNOSIS — R41.89 COGNITIVE CHANGES: ICD-10-CM

## 2024-07-15 DIAGNOSIS — Z01.818 PRE-OP EVALUATION: Primary | ICD-10-CM

## 2024-07-15 LAB
ALBUMIN SERPL BCP-MCNC: 4.1 G/DL (ref 3.4–5)
ALP SERPL-CCNC: 79 U/L (ref 33–136)
ALT SERPL W P-5'-P-CCNC: 17 U/L (ref 7–45)
ANION GAP SERPL CALC-SCNC: 9 MMOL/L (ref 10–20)
AST SERPL W P-5'-P-CCNC: 14 U/L (ref 9–39)
BILIRUB SERPL-MCNC: 0.6 MG/DL (ref 0–1.2)
BUN SERPL-MCNC: 11 MG/DL (ref 6–23)
CALCIUM SERPL-MCNC: 9.3 MG/DL (ref 8.6–10.3)
CHLORIDE SERPL-SCNC: 106 MMOL/L (ref 98–107)
CO2 SERPL-SCNC: 30 MMOL/L (ref 21–32)
CREAT SERPL-MCNC: 0.91 MG/DL (ref 0.5–1.05)
EGFRCR SERPLBLD CKD-EPI 2021: 71 ML/MIN/1.73M*2
ERYTHROCYTE [DISTWIDTH] IN BLOOD BY AUTOMATED COUNT: 13.3 % (ref 11.5–14.5)
GLUCOSE SERPL-MCNC: 93 MG/DL (ref 74–99)
HCT VFR BLD AUTO: 43 % (ref 36–46)
HGB BLD-MCNC: 14.1 G/DL (ref 12–16)
MCH RBC QN AUTO: 30.9 PG (ref 26–34)
MCHC RBC AUTO-ENTMCNC: 32.8 G/DL (ref 32–36)
MCV RBC AUTO: 94 FL (ref 80–100)
NRBC BLD-RTO: 0 /100 WBCS (ref 0–0)
PLATELET # BLD AUTO: 404 X10*3/UL (ref 150–450)
POTASSIUM SERPL-SCNC: 4.4 MMOL/L (ref 3.5–5.3)
PROT SERPL-MCNC: 6.8 G/DL (ref 6.4–8.2)
RBC # BLD AUTO: 4.56 X10*6/UL (ref 4–5.2)
SODIUM SERPL-SCNC: 141 MMOL/L (ref 136–145)
VIT B12 SERPL-MCNC: 949 PG/ML (ref 211–911)
WBC # BLD AUTO: 7.2 X10*3/UL (ref 4.4–11.3)

## 2024-07-15 PROCEDURE — 93000 ELECTROCARDIOGRAM COMPLETE: CPT

## 2024-07-15 PROCEDURE — 80053 COMPREHEN METABOLIC PANEL: CPT

## 2024-07-15 PROCEDURE — 3079F DIAST BP 80-89 MM HG: CPT

## 2024-07-15 PROCEDURE — 3074F SYST BP LT 130 MM HG: CPT

## 2024-07-15 PROCEDURE — 99213 OFFICE O/P EST LOW 20 MIN: CPT

## 2024-07-15 PROCEDURE — 36415 COLL VENOUS BLD VENIPUNCTURE: CPT

## 2024-07-15 PROCEDURE — 1036F TOBACCO NON-USER: CPT

## 2024-07-15 PROCEDURE — 85027 COMPLETE CBC AUTOMATED: CPT

## 2024-07-15 PROCEDURE — 82607 VITAMIN B-12: CPT

## 2024-07-15 RX ORDER — LAMOTRIGINE 150 MG/1
1 TABLET ORAL
COMMUNITY
Start: 2024-07-08

## 2024-07-15 RX ORDER — VENLAFAXINE 100 MG/1
1 TABLET ORAL
COMMUNITY
Start: 2024-07-08

## 2024-07-15 SDOH — ECONOMIC STABILITY: FOOD INSECURITY: WITHIN THE PAST 12 MONTHS, THE FOOD YOU BOUGHT JUST DIDN'T LAST AND YOU DIDN'T HAVE MONEY TO GET MORE.: NEVER TRUE

## 2024-07-15 SDOH — ECONOMIC STABILITY: FOOD INSECURITY: WITHIN THE PAST 12 MONTHS, YOU WORRIED THAT YOUR FOOD WOULD RUN OUT BEFORE YOU GOT MONEY TO BUY MORE.: NEVER TRUE

## 2024-07-15 ASSESSMENT — ANXIETY QUESTIONNAIRES
2. NOT BEING ABLE TO STOP OR CONTROL WORRYING: NEARLY EVERY DAY
6. BECOMING EASILY ANNOYED OR IRRITABLE: NEARLY EVERY DAY
4. TROUBLE RELAXING: NEARLY EVERY DAY
IF YOU CHECKED OFF ANY PROBLEMS ON THIS QUESTIONNAIRE, HOW DIFFICULT HAVE THESE PROBLEMS MADE IT FOR YOU TO DO YOUR WORK, TAKE CARE OF THINGS AT HOME, OR GET ALONG WITH OTHER PEOPLE: NOT DIFFICULT AT ALL
3. WORRYING TOO MUCH ABOUT DIFFERENT THINGS: NEARLY EVERY DAY
1. FEELING NERVOUS, ANXIOUS, OR ON EDGE: NEARLY EVERY DAY
5. BEING SO RESTLESS THAT IT IS HARD TO SIT STILL: SEVERAL DAYS
GAD7 TOTAL SCORE: 19
7. FEELING AFRAID AS IF SOMETHING AWFUL MIGHT HAPPEN: NEARLY EVERY DAY

## 2024-07-15 ASSESSMENT — ENCOUNTER SYMPTOMS
GASTROINTESTINAL NEGATIVE: 1
HEMATOLOGIC/LYMPHATIC NEGATIVE: 1
NERVOUS/ANXIOUS: 1
DEPRESSION: 0
ENDOCRINE NEGATIVE: 1
EYES NEGATIVE: 1
RESPIRATORY NEGATIVE: 1
MUSCULOSKELETAL NEGATIVE: 1
NEUROLOGICAL NEGATIVE: 1
CARDIOVASCULAR NEGATIVE: 1
ALLERGIC/IMMUNOLOGIC NEGATIVE: 1
OCCASIONAL FEELINGS OF UNSTEADINESS: 0
CONSTITUTIONAL NEGATIVE: 1
LOSS OF SENSATION IN FEET: 0

## 2024-07-15 ASSESSMENT — PATIENT HEALTH QUESTIONNAIRE - PHQ9
1. LITTLE INTEREST OR PLEASURE IN DOING THINGS: NOT AT ALL
SUM OF ALL RESPONSES TO PHQ9 QUESTIONS 1 & 2: 0
2. FEELING DOWN, DEPRESSED OR HOPELESS: NOT AT ALL

## 2024-07-15 ASSESSMENT — LIFESTYLE VARIABLES
HOW OFTEN DO YOU HAVE A DRINK CONTAINING ALCOHOL: NEVER
AUDIT-C TOTAL SCORE: 0
SKIP TO QUESTIONS 9-10: 1
HOW OFTEN DO YOU HAVE SIX OR MORE DRINKS ON ONE OCCASION: NEVER
HOW MANY STANDARD DRINKS CONTAINING ALCOHOL DO YOU HAVE ON A TYPICAL DAY: PATIENT DOES NOT DRINK

## 2024-07-15 ASSESSMENT — PAIN SCALES - GENERAL: PAINLEVEL: 0-NO PAIN

## 2024-07-15 NOTE — PROGRESS NOTES
"Subjective   Patient ID: Alondra Magaña is a 63 y.o. female who presents for surgical clearance (July 29, 2024 - Cataract removal  bilateral (one at a time staring with right eye) -  Dr Kennedy/Pt is working on controlling anxiety).    HPI   Alondra presents for surgical clearance for cataract surgery of right eye. She has no concerns at this time. CBC, CMP, and EKG ordered today as standard pre-op testing. She is medically clear for surgery.    Review of Systems   Constitutional: Negative.    HENT: Negative.     Eyes: Negative.    Respiratory: Negative.     Cardiovascular: Negative.    Gastrointestinal: Negative.    Endocrine: Negative.    Genitourinary: Negative.    Musculoskeletal: Negative.    Skin: Negative.    Allergic/Immunologic: Negative.    Neurological: Negative.    Hematological: Negative.    Psychiatric/Behavioral:  The patient is nervous/anxious.          Objective   /81 (BP Location: Right arm, Patient Position: Sitting, BP Cuff Size: Adult)   Pulse 78   Temp 36 °C (96.8 °F)   Resp 20   Ht 1.651 m (5' 5\")   Wt 97.3 kg (214 lb 8 oz)   SpO2 98%   BMI 35.69 kg/m²     Physical Exam  Cardiovascular:      Rate and Rhythm: Normal rate and regular rhythm.      Pulses: Normal pulses.      Heart sounds: Normal heart sounds.   Pulmonary:      Effort: Pulmonary effort is normal.      Breath sounds: Normal breath sounds.   Musculoskeletal:         General: Normal range of motion.   Skin:     General: Skin is warm and dry.      Capillary Refill: Capillary refill takes less than 2 seconds.   Neurological:      Mental Status: She is oriented to person, place, and time.   Psychiatric:         Mood and Affect: Mood normal.         Behavior: Behavior normal.         Thought Content: Thought content normal.         Judgment: Judgment normal.         Assessment/Plan   Problem List Items Addressed This Visit             ICD-10-CM    Pre-op evaluation - Primary Z01.818    Relevant Orders    CBC    Comprehensive " Metabolic Panel    ECG 12 lead (Clinic Performed)

## 2024-07-17 DIAGNOSIS — H25.811 COMBINED FORMS OF AGE-RELATED CATARACT OF RIGHT EYE: Primary | ICD-10-CM

## 2024-07-17 RX ORDER — CIPROFLOXACIN HYDROCHLORIDE 3 MG/ML
1 SOLUTION/ DROPS OPHTHALMIC 3 TIMES DAILY
Qty: 5 ML | Refills: 0 | Status: SHIPPED | OUTPATIENT
Start: 2024-07-17

## 2024-07-17 RX ORDER — PREDNISOLONE ACETATE 10 MG/ML
1 SUSPENSION/ DROPS OPHTHALMIC 4 TIMES DAILY
Qty: 5 ML | Refills: 0 | Status: SHIPPED | OUTPATIENT
Start: 2024-07-17

## 2024-07-18 ENCOUNTER — TELEPHONE (OUTPATIENT)
Dept: NEUROLOGY | Facility: HOSPITAL | Age: 64
End: 2024-07-18
Payer: COMMERCIAL

## 2024-07-18 NOTE — TELEPHONE ENCOUNTER
----- Message from Harmeet Lawrence sent at 7/17/2024  8:15 AM EDT -----  7/17/24 JOSSY ward  Will have staff let pt know  Dr lawrence

## 2024-07-22 ENCOUNTER — ANESTHESIA EVENT (OUTPATIENT)
Dept: OPERATING ROOM | Facility: CLINIC | Age: 64
End: 2024-07-22
Payer: COMMERCIAL

## 2024-07-22 ENCOUNTER — TELEPHONE (OUTPATIENT)
Dept: OPHTHALMOLOGY | Facility: CLINIC | Age: 64
End: 2024-07-22
Payer: COMMERCIAL

## 2024-07-25 ENCOUNTER — APPOINTMENT (OUTPATIENT)
Dept: PRIMARY CARE | Facility: CLINIC | Age: 64
End: 2024-07-25

## 2024-07-29 ENCOUNTER — ANESTHESIA (OUTPATIENT)
Dept: OPERATING ROOM | Facility: CLINIC | Age: 64
End: 2024-07-29
Payer: COMMERCIAL

## 2024-07-29 ENCOUNTER — PREP FOR PROCEDURE (OUTPATIENT)
Dept: OPHTHALMOLOGY | Facility: CLINIC | Age: 64
End: 2024-07-29

## 2024-07-29 ENCOUNTER — HOSPITAL ENCOUNTER (OUTPATIENT)
Facility: CLINIC | Age: 64
Setting detail: OUTPATIENT SURGERY
Discharge: HOME | End: 2024-07-29
Attending: OPHTHALMOLOGY | Admitting: OPHTHALMOLOGY
Payer: COMMERCIAL

## 2024-07-29 VITALS
HEART RATE: 73 BPM | RESPIRATION RATE: 16 BRPM | TEMPERATURE: 96.8 F | OXYGEN SATURATION: 98 % | WEIGHT: 213.19 LBS | HEIGHT: 65 IN | BODY MASS INDEX: 35.52 KG/M2 | SYSTOLIC BLOOD PRESSURE: 151 MMHG | DIASTOLIC BLOOD PRESSURE: 78 MMHG

## 2024-07-29 DIAGNOSIS — H25.811 COMBINED FORMS OF AGE-RELATED CATARACT OF RIGHT EYE: Primary | ICD-10-CM

## 2024-07-29 LAB — GLUCOSE BLD MANUAL STRIP-MCNC: 85 MG/DL (ref 74–99)

## 2024-07-29 PROCEDURE — 2500000005 HC RX 250 GENERAL PHARMACY W/O HCPCS: Performed by: OPHTHALMOLOGY

## 2024-07-29 PROCEDURE — A4649 SURGICAL SUPPLIES: HCPCS | Performed by: OPHTHALMOLOGY

## 2024-07-29 PROCEDURE — 66984 XCAPSL CTRC RMVL W/O ECP: CPT | Performed by: OPHTHALMOLOGY

## 2024-07-29 PROCEDURE — C1780 LENS, INTRAOCULAR (NEW TECH): HCPCS | Performed by: OPHTHALMOLOGY

## 2024-07-29 PROCEDURE — 3700000001 HC GENERAL ANESTHESIA TIME - INITIAL BASE CHARGE: Performed by: OPHTHALMOLOGY

## 2024-07-29 PROCEDURE — 3600000003 HC OR TIME - INITIAL BASE CHARGE - PROCEDURE LEVEL THREE: Performed by: OPHTHALMOLOGY

## 2024-07-29 PROCEDURE — 2720000007 HC OR 272 NO HCPCS: Performed by: OPHTHALMOLOGY

## 2024-07-29 PROCEDURE — A66984 PR REMV CATARACT EXTRACAP,INSERT LENS

## 2024-07-29 PROCEDURE — 2760000001 HC OR 276 NO HCPCS: Performed by: OPHTHALMOLOGY

## 2024-07-29 PROCEDURE — A66984 PR REMV CATARACT EXTRACAP,INSERT LENS: Performed by: ANESTHESIOLOGY

## 2024-07-29 PROCEDURE — 3600000008 HC OR TIME - EACH INCREMENTAL 1 MINUTE - PROCEDURE LEVEL THREE: Performed by: OPHTHALMOLOGY

## 2024-07-29 PROCEDURE — 2500000001 HC RX 250 WO HCPCS SELF ADMINISTERED DRUGS (ALT 637 FOR MEDICARE OP): Performed by: OPHTHALMOLOGY

## 2024-07-29 PROCEDURE — 7100000010 HC PHASE TWO TIME - EACH INCREMENTAL 1 MINUTE: Performed by: OPHTHALMOLOGY

## 2024-07-29 PROCEDURE — 2500000004 HC RX 250 GENERAL PHARMACY W/ HCPCS (ALT 636 FOR OP/ED)

## 2024-07-29 PROCEDURE — 82947 ASSAY GLUCOSE BLOOD QUANT: CPT

## 2024-07-29 PROCEDURE — 7100000009 HC PHASE TWO TIME - INITIAL BASE CHARGE: Performed by: OPHTHALMOLOGY

## 2024-07-29 PROCEDURE — 3700000002 HC GENERAL ANESTHESIA TIME - EACH INCREMENTAL 1 MINUTE: Performed by: OPHTHALMOLOGY

## 2024-07-29 DEVICE — ACRYSOF(R) IQ ASPHERIC IOL SP ACRYLIC FOLDABLELENS WULTRASERT(TM) DELIVERY SYSTEM UV WBLUE LIGHT FILTER. 13.0MM LENGTH 6.0MM ANTERIORASYMMETRIC BICONVEX OPTIC PLANAR HAPTICS.
Type: IMPLANTABLE DEVICE | Site: EYE | Status: FUNCTIONAL
Brand: ACRYSOF ULTRASERT

## 2024-07-29 RX ORDER — MIDAZOLAM HYDROCHLORIDE 1 MG/ML
INJECTION, SOLUTION INTRAMUSCULAR; INTRAVENOUS AS NEEDED
Status: DISCONTINUED | OUTPATIENT
Start: 2024-07-29 | End: 2024-07-29

## 2024-07-29 RX ORDER — SODIUM CHLORIDE, SODIUM LACTATE, POTASSIUM CHLORIDE, CALCIUM CHLORIDE 600; 310; 30; 20 MG/100ML; MG/100ML; MG/100ML; MG/100ML
100 INJECTION, SOLUTION INTRAVENOUS CONTINUOUS
Status: CANCELLED | OUTPATIENT
Start: 2024-07-29

## 2024-07-29 RX ORDER — MOXIFLOXACIN 5 MG/ML
SOLUTION/ DROPS OPHTHALMIC AS NEEDED
Status: DISCONTINUED | OUTPATIENT
Start: 2024-07-29 | End: 2024-07-29 | Stop reason: HOSPADM

## 2024-07-29 RX ORDER — LIDOCAINE HYDROCHLORIDE 10 MG/ML
INJECTION INFILTRATION; PERINEURAL AS NEEDED
Status: DISCONTINUED | OUTPATIENT
Start: 2024-07-29 | End: 2024-07-29 | Stop reason: HOSPADM

## 2024-07-29 RX ORDER — PREDNISOLONE ACETATE 10 MG/ML
SUSPENSION/ DROPS OPHTHALMIC AS NEEDED
Status: DISCONTINUED | OUTPATIENT
Start: 2024-07-29 | End: 2024-07-29 | Stop reason: HOSPADM

## 2024-07-29 RX ORDER — LIDOCAINE HYDROCHLORIDE 10 MG/ML
0.1 INJECTION, SOLUTION EPIDURAL; INFILTRATION; INTRACAUDAL; PERINEURAL ONCE
Status: CANCELLED | OUTPATIENT
Start: 2024-07-29 | End: 2024-07-29

## 2024-07-29 RX ORDER — ONDANSETRON HYDROCHLORIDE 2 MG/ML
4 INJECTION, SOLUTION INTRAVENOUS ONCE AS NEEDED
Status: CANCELLED | OUTPATIENT
Start: 2024-07-29

## 2024-07-29 RX ORDER — FENTANYL CITRATE 50 UG/ML
INJECTION, SOLUTION INTRAMUSCULAR; INTRAVENOUS AS NEEDED
Status: DISCONTINUED | OUTPATIENT
Start: 2024-07-29 | End: 2024-07-29

## 2024-07-29 RX ORDER — TROPICAMIDE 10 MG/ML
1 SOLUTION/ DROPS OPHTHALMIC
Status: COMPLETED | OUTPATIENT
Start: 2024-07-29 | End: 2024-07-29

## 2024-07-29 RX ORDER — PHENYLEPHRINE HYDROCHLORIDE 25 MG/ML
1 SOLUTION/ DROPS OPHTHALMIC
Status: COMPLETED | OUTPATIENT
Start: 2024-07-29 | End: 2024-07-29

## 2024-07-29 RX ORDER — TETRACAINE HYDROCHLORIDE 5 MG/ML
SOLUTION OPHTHALMIC AS NEEDED
Status: DISCONTINUED | OUTPATIENT
Start: 2024-07-29 | End: 2024-07-29 | Stop reason: HOSPADM

## 2024-07-29 RX ORDER — SODIUM CHLORIDE, SODIUM LACTATE, POTASSIUM CHLORIDE, CALCIUM CHLORIDE 600; 310; 30; 20 MG/100ML; MG/100ML; MG/100ML; MG/100ML
100 INJECTION, SOLUTION INTRAVENOUS CONTINUOUS
Status: DISCONTINUED | OUTPATIENT
Start: 2024-07-29 | End: 2024-07-29 | Stop reason: HOSPADM

## 2024-07-29 RX ORDER — TROPICAMIDE 10 MG/ML
1 SOLUTION/ DROPS OPHTHALMIC
Status: CANCELLED | OUTPATIENT
Start: 2024-07-29 | End: 2024-07-29

## 2024-07-29 RX ORDER — PHENYLEPHRINE HYDROCHLORIDE 25 MG/ML
1 SOLUTION/ DROPS OPHTHALMIC
Status: CANCELLED | OUTPATIENT
Start: 2024-07-29 | End: 2024-07-29

## 2024-07-29 SDOH — HEALTH STABILITY: MENTAL HEALTH: CURRENT SMOKER: 0

## 2024-07-29 ASSESSMENT — PAIN - FUNCTIONAL ASSESSMENT
PAIN_FUNCTIONAL_ASSESSMENT: 0-10

## 2024-07-29 ASSESSMENT — PAIN SCALES - GENERAL
PAINLEVEL_OUTOF10: 0 - NO PAIN

## 2024-07-29 ASSESSMENT — COLUMBIA-SUICIDE SEVERITY RATING SCALE - C-SSRS
1. IN THE PAST MONTH, HAVE YOU WISHED YOU WERE DEAD OR WISHED YOU COULD GO TO SLEEP AND NOT WAKE UP?: NO
2. HAVE YOU ACTUALLY HAD ANY THOUGHTS OF KILLING YOURSELF?: NO
6. HAVE YOU EVER DONE ANYTHING, STARTED TO DO ANYTHING, OR PREPARED TO DO ANYTHING TO END YOUR LIFE?: NO

## 2024-07-29 NOTE — ANESTHESIA POSTPROCEDURE EVALUATION
Patient: Alondra Magaña    Procedure Summary       Date: 07/29/24 [place] [facility] [place] [facility] [place]    Anesthesia Start: 1144 Anesthesia Stop: 1240    Procedure: Extraction Cataract with Insertion Intraocular Lens (Right) Diagnosis:       Combined forms of age-related cataract of right eye      (Combined forms of age-related cataract of right eye [H25.811])    Surgeons: Benjamin Martinez MD Responsible Provider: Anna Brunson MD    Anesthesia Type: MAC ASA Status: 3            Anesthesia Type: MAC    Vitals Value Taken Time   /78 07/29/24 1306   Temp 36 °C (96.8 °F) 07/29/24 1306   Pulse 73 07/29/24 1306   Resp 16 07/29/24 1306   SpO2 98 % 07/29/24 1306       Anesthesia Post Evaluation    Patient participation: complete - patient participated  Level of consciousness: awake and alert  Pain management: adequate  Airway patency: patent  Cardiovascular status: acceptable  Respiratory status: acceptable  Hydration status: acceptable  Postoperative Nausea and Vomiting: none    No notable events documented.

## 2024-07-29 NOTE — OP NOTE
Extraction Cataract with Insertion Intraocular Lens (R) Operative Note     Date: 2024  OR Location: [facility] OR    Name: Alondra Magaña, : 1960, Age: 63 y.o., MRN: [id number], Sex: female    Diagnosis  Pre-op Diagnosis      * Combined forms of age-related cataract of right eye [H25.811] Post-op Diagnosis     * Combined forms of age-related cataract of right eye [H25.811]     Procedures  Extraction Cataract with Insertion Intraocular Lens  10414 - TX XCAPSL CTRC RMVL INSJ IO LENS PROSTH W/O ECP      Surgeons      * Benjamin Martinez - Primary    Resident/Fellow/Other Assistant:  Surgeons and Role:     * Jerald Martin MD - Resident - Assisting    Procedure Summary  Anesthesia: Monitor Anesthesia Care  ASA: III  Anesthesia Staff: Anesthesiologist: Anna Brunson MD  C-AA: CORA Keane  Estimated Blood Loss: Less than 1 mL  Intra-op Medications:   Administrations occurring from 1130 to 1300 on 24:   Medication Name Total Dose   balanced salts (BSS Plus) intraocular solution 200 mL   balanced salts (BSS) intraocular solution 10 mL   tetracaine (PF) 0.5 % ophthalmic solution 2 drop   prednisoLONE acetate (Pred-Forte) 1 % ophthalmic suspension 1 drop   lidocaine (Xylocaine) 10 mg/mL (1 %) injection 1 mL   chondroitin sulf-sod hyaluron (Duovisc) intraocular kit 0.5 mL   moxifloxacin (Vigamox) 0.5 % ophthalmic solution 1 drop              Anesthesia Record               Intraprocedure I/O Totals          Intake    LR bolus 5.00 mL    Total Intake 5 mL          Specimen: No specimens collected     Staff:   Circulator: Felicity Piedra Person: Navya         Drains and/or Catheters: * None in log *    Tourniquet Times:         Implants:  Implants       Type Name Action Serial No.      Lens LENS, INTRAOCULAR, AU00T0 21.5D - [id number] - [id number] Implanted [id number]              Findings: Unremarkable, as below    Indications: Alondra Magaña is an 63 y.o. female  who is having surgery for Combined forms of age-related cataract of right eye [H25.811].  Preoperatively complained of blurry vision over time, has stopped driving due to vision difficulties.  Ophthalmoscopy showed no findings consistent with decreased vision beyond cataract.  Risks and benefits were discussed with the patient.  Improvement of vision is still the expected outcome.  Treatment and exams are expected in the postoperative period.  Patient was judged to be in stable condition undergo cataract surgery.    The patient was seen in the preoperative area. The risks, benefits, complications, treatment options, non-operative alternatives, expected recovery and outcomes were discussed with the patient. The possibilities of reaction to medication, pulmonary aspiration, injury to surrounding structures, bleeding, recurrent infection, the need for additional procedures, failure to diagnose a condition, and creating a complication requiring transfusion or operation were discussed with the patient. The patient concurred with the proposed plan, giving informed consent.  The site of surgery was properly noted/marked if necessary per policy. The patient has been actively warmed in preoperative area. Preoperative antibiotics are not indicated. Venous thrombosis prophylaxis are not indicated.    Procedure Details: The patient was taken to the OR and during monitored anesthesia care with topical anesthesia of the right eye, the right eye was prepped and draped in usual sterile fashion for cataract extraction.  A lid speculum was placed into the right eye and the operating microscope was brought into proper position.  A paracentesis was placed with a 15 degree blade at the 7 o'clock position and the anterior chamber was then filled with intracameral lidocaine followed by viscoelastic until full.  A 3 planar clear corneal incision was then created using a crescent blade followed by a keratome at the 11 o'clock position.   Continuous capsulorrhexis was then created using a bent cystitome and Utrata forceps.  Hydrodissection was performed successfully between the cortex and capsular bag with clear fluid waves visible.  Phacoemulsification was then undertaken with removal of the entire lens nucleus.  The remaining cortex was aspirated from the right eye using gentle irrigation and aspiration.  At this point, the capsular bag was again filled with viscoelastic and AU00T0 +21.5 diopter lens was placed into the posterior chamber in the capsular bag.  Any remaining viscoelastic was then removed from the right eye using gentle irrigation and aspiration.  BSS was used to fill the anterior chamber and hydrate the wounds which were found to be self-sealing.  The lid speculum was removed from the right eye in the anterior chamber remained well-formed.  Moxifloxacin and prednisolone acetate were then instilled onto the surface of the right eye followed by a clear shield.  The patient was taken to the postoperative area in good condition.  Postoperative instructions were reviewed with the patient prior to discharge including calling my office or answering service overnight.  The patient was told to follow up in the office with me the next day.  Estimated blood loss less than 1 cubic centimeter.  IV fluids in urine output as per any anesthesia notes.  No specimens were taken and the lens prosthesis was placed.  There were no complications and the findings are unremarkable.  Complications:  None; patient tolerated the procedure well.    Disposition: PACU - hemodynamically stable.  Condition: stable         Additional Details: CDE of 2.26    Attending Attestation:     Benjamin Martinez  Phone Number: [phone]

## 2024-07-29 NOTE — ANESTHESIA PREPROCEDURE EVALUATION
Patient: Alondra Magaña    Procedure Information       Date/Time: 07/29/24 1130    Procedure: Extraction Cataract with Insertion Intraocular Lens (Right)    [place] [facility] [place] [facility] [place] [facility]    Surgeons: Benjamin Martinez MD          Vitals:    07/29/24 1020   BP: 136/65   Pulse: 75   Resp: 16   Temp: 36.5 °C (97.7 °F)   SpO2: 99%       Past Surgical History:   Procedure Laterality Date   • COLPOSCOPY     • OTHER SURGICAL HISTORY  11/19/2020    Cholecystectomy   • OTHER SURGICAL HISTORY  11/19/2020    Bariatric surgery   • OTHER SURGICAL HISTORY  11/19/2020    Open reduction-internal fixation     Past Medical History:   Diagnosis Date   • Anxiety    • Depression    • Fracture of unspecified part of left clavicle, initial encounter for closed fracture 01/21/2019    Fracture of left clavicle   • Personal history of other diseases of the respiratory system 01/18/2017    History of bronchitis   • Sleep apnea    • Type 2 diabetes mellitus with hyperglycemia (Multi) 08/18/2020    Uncontrolled type 2 diabetes mellitus with hyperglycemia       Current Facility-Administered Medications:   •  lactated Ringer's infusion, 100 mL/hr, intravenous, Continuous, Anna Brunson MD  Prior to Admission medications    Medication Sig Start Date End Date Taking? Authorizing Provider   cariprazine (Vraylar) 1.5 mg capsule Take 1 capsule (1.5 mg) by mouth once daily. 4/24/23  Yes Historical Provider, MD   ciprofloxacin (Ciloxan) 0.3 % ophthalmic solution Administer 1 drop into the right eye 3 times a day. Starting three days prior to surgery 7/17/24  Yes Benjamin Martinez MD   clorazepate (Tranxene) 3.75 mg tablet Take 1 tablet (3.75 mg) by mouth 2 times a day as needed for anxiety. 3/20/23 7/22/24 Yes Masha Wallace,    clorazepate (Tranxene) 3.75 mg tablet Take 1 tablet (3.75 mg) by mouth 2 times a day as needed.   Yes Historical Provider, MD   ergocalciferol (Vitamin D-2) 1.25 MG (01954 UT)  capsule Take 1 capsule (1,250 mcg) by mouth 1 (one) time per week.   Yes Historical Provider, MD   lamoTRIgine (LaMICtal) 100 mg tablet Take 1 tablet (100 mg) by mouth 2 times a day. 100mg AM  100 mg PM   Yes Historical Provider, MD   lamoTRIgine (LaMICtal) 150 mg tablet Take 1 tablet (150 mg) by mouth every 12 hours. 7/8/24  Yes Historical Provider, MD   LORazepam (Ativan) 0.5 mg tablet Take 1 tab 15 min prior to MRI, needs  to and from 4/22/24  Yes Harmeet Hooks MD   prednisoLONE acetate (Pred-Forte) 1 % ophthalmic suspension Administer 1 drop into the right eye 4 times a day. Starting the day after surgery 7/17/24  Yes Benjamin Martinez MD   tirzepatide (Mounjaro) 5 mg/0.5 mL pen injector Inject 5 mg under the skin 1 (one) time per week. 12/11/23 12/10/24 Yes Masha Wallace, DO   traZODone (Desyrel) 100 mg tablet Take 1 tablet (100 mg) by mouth as needed at bedtime for sleep. 8/16/23 8/15/24 Yes Mahsa Wallace, DO   venlafaxine (Effexor) 100 mg tablet Take 1 tablet (100 mg) by mouth every 12 hours. 7/8/24  Yes Historical Provider, MD Gonzalez Guide test strips strip  12/6/22   Historical Provider, MD SahauCary Softclix Lancets misc  12/6/22   Historical Provider, MD   estradiol (Estrace) 0.01 % (0.1 mg/gram) vaginal cream Insert 0.5 Applicatorfuls (2 g) into the vagina once daily. Apply vaginally once a day for 1 week, and then twice weekly thereafter  Patient not taking: Reported on 7/15/2024 5/31/24 8/3/24  DONATO Ramirez-CNP   venlafaxine (Effexor) 50 mg tablet Take 1 tablet (50 mg) by mouth 2 times a day.  Patient not taking: Reported on 7/15/2024 8/11/23   Masha Wallace, DO     Allergies   Allergen Reactions   • Nsaids (Non-Steroidal Anti-Inflammatory Drug) Other     Gastric Bypass    Other reaction(s): Contraindication-Medical Surgical, Other   Gastric Bypass   Gastric Bypass   • Shellfish Containing Products Unknown   • Shellfish Derived Other     Social History  "    Tobacco Use   • Smoking status: Never     Passive exposure: Never   • Smokeless tobacco: Never   Substance Use Topics   • Alcohol use: Never         Chemistry    Lab Results   Component Value Date/Time     07/15/2024 0944    K 4.4 07/15/2024 0944     07/15/2024 0944    CO2 30 07/15/2024 0944    BUN 11 07/15/2024 0944    CREATININE 0.91 07/15/2024 0944    Lab Results   Component Value Date/Time    CALCIUM 9.3 07/15/2024 0944    ALKPHOS 79 07/15/2024 0944    AST 14 07/15/2024 0944    ALT 17 07/15/2024 0944    BILITOT 0.6 07/15/2024 0944          Lab Results   Component Value Date/Time    WBC 7.2 07/15/2024 0944    HGB 14.1 07/15/2024 0944    HCT 43.0 07/15/2024 0944     07/15/2024 0944     No results found for: \"PROTIME\", \"PTT\", \"INR\"  Encounter Date: 07/15/24   ECG 12 lead (Clinic Performed)    Narrative    Sinus rhythm- probable left atrial enlargement  P: 63  QRS: 4  T: 20     No results found for this or any previous visit from the past 1095 days.        Relevant Problems   Anesthesia   (+) PONV (postoperative nausea and vomiting)      Pulmonary   (+) Obstructive sleep apnea      Neuro   (+) Generalized anxiety disorder with panic attacks   (+) Major depressive disorder, recurrent episode, moderate (Multi)      GI   (+) Esophageal reflux      Endocrine   (+) Class 2 severe obesity due to excess calories with serious comorbidity and body mass index (BMI) of 36.0 to 36.9 in adult (Multi)   (+) Controlled type 2 diabetes mellitus without complication, without long-term current use of insulin (Multi)       Clinical information reviewed:   Tobacco  Allergies  Meds   Med Hx  Surg Hx   Fam Hx  Soc Hx        NPO Detail:  NPO/Void Status  Date of Last Liquid: 07/29/24  Time of Last Liquid: 0000  Date of Last Solid: 07/29/24  Time of Last Solid: 0000         Physical Exam    Airway  Mallampati: IV  TM distance: >3 FB  Neck ROM: full     Cardiovascular   Rhythm: regular  Rate: normal     Dental "        Pulmonary   Breath sounds clear to auscultation     Abdominal        Anesthesia Plan    History of general anesthesia?: yes  History of complications of general anesthesia?: no    ASA 3     MAC     The patient is not a current smoker.    intravenous induction   Anesthetic plan and risks discussed with patient and spouse.    Plan discussed with CAA.

## 2024-07-29 NOTE — H&P
"History Of Present Illness  Alondra Magaña is a 63 y.o. female presenting with combined age-related cataract of the right eye .     Past Medical History  Past Medical History:   Diagnosis Date    Anxiety     Depression     Fracture of unspecified part of left clavicle, initial encounter for closed fracture 01/21/2019    Fracture of left clavicle    Personal history of other diseases of the respiratory system 01/18/2017    History of bronchitis    Sleep apnea     Type 2 diabetes mellitus with hyperglycemia (Multi) 08/18/2020    Uncontrolled type 2 diabetes mellitus with hyperglycemia       Surgical History  Past Surgical History:   Procedure Laterality Date    COLPOSCOPY      OTHER SURGICAL HISTORY  11/19/2020    Cholecystectomy    OTHER SURGICAL HISTORY  11/19/2020    Bariatric surgery    OTHER SURGICAL HISTORY  11/19/2020    Open reduction-internal fixation        Social History  She reports that she has never smoked. She has never been exposed to tobacco smoke. She has never used smokeless tobacco. She reports that she does not drink alcohol and does not use drugs.    Family History  Family History   Problem Relation Name Age of Onset    Colon cancer Maternal Grandfather          Allergies  Nsaids (non-steroidal anti-inflammatory drug), Shellfish containing products, and Shellfish derived    Review of Systems  Constitutional: Negative.    HENT: Negative except as noted in HPI.     Eyes: Negative.    Respiratory: Negative.     Cardiovascular: Negative.    Gastrointestinal: Negative.        Physical Exam  General: A&Ox3  HEENT: NC/AT  Lungs: CTAB, no signs of respiratory distress  CV: RRR  Abdomen: soft, NT/ND      Last Recorded Vitals  Blood pressure 136/65, pulse 75, temperature 36.5 °C (97.7 °F), temperature source Temporal, resp. rate 16, height 1.651 m (5' 5\"), weight 96.7 kg (213 lb 3 oz), SpO2 99%.     Assessment/Plan   Principal Problem:    Combined forms of age-related cataract of right eye    Alondra Magaña " is a 63 y.o. female with combined age-related cataract of the right eye presenting for cataract extraction and insertion of intraocular lens of the right eye. Consent obtained 07/29/24.         Jerald Martin MD

## 2024-07-30 ENCOUNTER — OFFICE VISIT (OUTPATIENT)
Dept: OPHTHALMOLOGY | Facility: CLINIC | Age: 64
End: 2024-07-30
Payer: COMMERCIAL

## 2024-07-30 DIAGNOSIS — Z98.41 CATARACT EXTRACTION STATUS OF RIGHT EYE: Primary | ICD-10-CM

## 2024-07-30 DIAGNOSIS — Z96.1 PSEUDOPHAKIA OF RIGHT EYE: ICD-10-CM

## 2024-07-30 PROBLEM — H25.811 COMBINED FORMS OF AGE-RELATED CATARACT OF RIGHT EYE: Status: RESOLVED | Noted: 2024-06-14 | Resolved: 2024-07-30

## 2024-07-30 PROCEDURE — 99211 OFF/OP EST MAY X REQ PHY/QHP: CPT | Performed by: OPHTHALMOLOGY

## 2024-07-30 RX ORDER — PREDNISOLONE ACETATE 10 MG/ML
1 SUSPENSION/ DROPS OPHTHALMIC 4 TIMES DAILY
Qty: 5 ML | Refills: 0 | Status: SHIPPED | OUTPATIENT
Start: 2024-07-30 | End: 2024-07-30

## 2024-07-30 RX ORDER — PREDNISOLONE ACETATE 10 MG/ML
1 SUSPENSION/ DROPS OPHTHALMIC 4 TIMES DAILY
Start: 2024-07-30

## 2024-07-30 RX ORDER — CIPROFLOXACIN HYDROCHLORIDE 3 MG/ML
1 SOLUTION/ DROPS OPHTHALMIC 4 TIMES DAILY
Start: 2024-07-30

## 2024-07-30 ASSESSMENT — VISUAL ACUITY
OD_SC+: -1
OS_PH_SC+: -2
OS_SC: 20/60
OD_SC: 20/25
OS_PH_SC: 20/50
METHOD: SNELLEN - LINEAR

## 2024-07-30 ASSESSMENT — ENCOUNTER SYMPTOMS
EYES NEGATIVE: 0
ALLERGIC/IMMUNOLOGIC NEGATIVE: 0
HEMATOLOGIC/LYMPHATIC NEGATIVE: 0
NEUROLOGICAL NEGATIVE: 0
PSYCHIATRIC NEGATIVE: 0
CARDIOVASCULAR NEGATIVE: 0
ENDOCRINE NEGATIVE: 0
GASTROINTESTINAL NEGATIVE: 0
MUSCULOSKELETAL NEGATIVE: 0
RESPIRATORY NEGATIVE: 0
CONSTITUTIONAL NEGATIVE: 0

## 2024-07-30 ASSESSMENT — TONOMETRY
OD_IOP_MMHG: 15
IOP_METHOD: GOLDMANN APPLANATION

## 2024-07-30 ASSESSMENT — SLIT LAMP EXAM - LIDS
COMMENTS: NORMAL
COMMENTS: NORMAL

## 2024-07-30 ASSESSMENT — PAIN SCALES - GENERAL
PAINLEVEL: 0-NO PAIN
PAINLEVEL_OUTOF10: 0 - NO PAIN

## 2024-07-30 ASSESSMENT — PATIENT HEALTH QUESTIONNAIRE - PHQ9
1. LITTLE INTEREST OR PLEASURE IN DOING THINGS: NOT AT ALL
SUM OF ALL RESPONSES TO PHQ9 QUESTIONS 1 AND 2: 0
2. FEELING DOWN, DEPRESSED OR HOPELESS: NOT AT ALL

## 2024-07-30 ASSESSMENT — EXTERNAL EXAM - LEFT EYE: OS_EXAM: NORMAL

## 2024-07-30 ASSESSMENT — EXTERNAL EXAM - RIGHT EYE: OD_EXAM: NORMAL

## 2024-07-30 NOTE — ASSESSMENT & PLAN NOTE
Healing well 1 day post op right eye (OD). Discussed drops and precautions. See back in 1 week for post op or sooner PRN.

## 2024-07-30 NOTE — PATIENT INSTRUCTIONS
1 day Post-operative instructions for Cataract Surgery  Valeriano Martinez MD    Date of surgery: 7/29/24  Surgical eye: Right    *Please keep the included lens card for your records    OK to resume normal medications you may have stopped prior to surgery    Restrictions:  Do not rub the eye  Avoid heavy lifting or straining, especially while bending over  Wear plastic shield while sleeping/napping  Apply shield over eye and use included tape from forehead to cheek across edge of shield  Please avoid any water getting into the operative eye, including bathing or swimming    Care instructions:  You may wash your eye gently with a warm moistened wash cloth. Please keep your eye shut and avoid heavy pressure. Avoid water getting directly into the eye.   You may shower or bathe as normal. If bathing, close eye and keep dry wash cloth over eye during washing/rinsing hair.    Drop instructions:  Ciprofloxacin (tan)four times daily to the Right eye  Prednisolone (pink)four times daily to the Right eye  Please shake prednisolone drops, have a milky appearance. If you can't remember which to shake, it is ok to shake them all    Wait several minutes after instillation of any drop before applying the next    Any concerns, issues, or questions please call our office  Please report any significantly increased redness/pain or any big vision changes    ----------------------------------------------------------------------------------------------------------------------    Thank you so much for choosing me to provide your care today!    If you were dilated your vision may remain blurry   or light sensitive for several hours.    The nature of eye and vision problems can require frequent follow up, please make every effort to adhere to any future appointments.    If you have any issues, questions, or concerns,   please do not hesitate to reach out.    If you receive a survey in regards to your care today, please mention any exceptional  care my office staff and/or technicians provided.    You can reach our office at this number:  [phone]

## 2024-07-30 NOTE — PROGRESS NOTES
Assessment/Plan   Problem List Items Addressed This Visit       Cataract extraction status of right eye - Primary     Healing well 1 day post op right eye (OD). Discussed drops and precautions. See back in 1 week for post op or sooner PRN.          Relevant Medications    ciprofloxacin (Ciloxan) 0.3 % ophthalmic solution    prednisoLONE acetate (Pred-Forte) 1 % ophthalmic suspension    Pseudophakia of right eye     Stable appearing intraocular lens (IOL) right eye (OD), will monitor with serial exam.             Provided reassurance regarding above diagnoses and care received in the office visit today. Discussed outcomes and options along with the importance of treatment compliance. Understands the importance of any follow up visits. Patient instructed to call/communicate with our office if any new issues, questions, or concerns.     Will plan to see back in 1 week post op or sooner PRN

## 2024-08-01 ENCOUNTER — ANESTHESIA EVENT (OUTPATIENT)
Dept: OPERATING ROOM | Facility: CLINIC | Age: 64
End: 2024-08-01
Payer: COMMERCIAL

## 2024-08-02 ENCOUNTER — PHARMACY VISIT (OUTPATIENT)
Dept: PHARMACY | Facility: CLINIC | Age: 64
End: 2024-08-02
Payer: COMMERCIAL

## 2024-08-02 PROCEDURE — RXMED WILLOW AMBULATORY MEDICATION CHARGE

## 2024-08-06 ENCOUNTER — OFFICE VISIT (OUTPATIENT)
Dept: OPHTHALMOLOGY | Facility: CLINIC | Age: 64
End: 2024-08-06
Payer: COMMERCIAL

## 2024-08-06 DIAGNOSIS — H25.812 COMBINED FORMS OF AGE-RELATED CATARACT OF LEFT EYE: ICD-10-CM

## 2024-08-06 DIAGNOSIS — Z96.1 PSEUDOPHAKIA OF RIGHT EYE: ICD-10-CM

## 2024-08-06 DIAGNOSIS — Z98.41 CATARACT EXTRACTION STATUS OF RIGHT EYE: Primary | ICD-10-CM

## 2024-08-06 PROCEDURE — 99211 OFF/OP EST MAY X REQ PHY/QHP: CPT | Performed by: OPHTHALMOLOGY

## 2024-08-06 RX ORDER — PREDNISOLONE ACETATE 10 MG/ML
SUSPENSION/ DROPS OPHTHALMIC
Start: 2024-08-06

## 2024-08-06 RX ORDER — CIPROFLOXACIN HYDROCHLORIDE 3 MG/ML
1 SOLUTION/ DROPS OPHTHALMIC 3 TIMES DAILY
Start: 2024-08-06

## 2024-08-06 ASSESSMENT — PAIN SCALES - GENERAL: PAINLEVEL: 0-NO PAIN

## 2024-08-06 ASSESSMENT — ENCOUNTER SYMPTOMS
HEMATOLOGIC/LYMPHATIC NEGATIVE: 0
RESPIRATORY NEGATIVE: 0
ALLERGIC/IMMUNOLOGIC NEGATIVE: 0
MUSCULOSKELETAL NEGATIVE: 0
ENDOCRINE NEGATIVE: 0
CARDIOVASCULAR NEGATIVE: 0
NEUROLOGICAL NEGATIVE: 0
GASTROINTESTINAL NEGATIVE: 0
EYES NEGATIVE: 0
CONSTITUTIONAL NEGATIVE: 0
PSYCHIATRIC NEGATIVE: 0

## 2024-08-06 ASSESSMENT — VISUAL ACUITY
OS_PH_SC+: -1
OD_SC+: +2
METHOD: SNELLEN - LINEAR
OS_SC: 20/50
OS_SC+: -1
OS_PH_SC: 20/40
OD_SC: 20/40

## 2024-08-06 ASSESSMENT — EXTERNAL EXAM - RIGHT EYE: OD_EXAM: NORMAL

## 2024-08-06 ASSESSMENT — PATIENT HEALTH QUESTIONNAIRE - PHQ9
2. FEELING DOWN, DEPRESSED OR HOPELESS: NOT AT ALL
SUM OF ALL RESPONSES TO PHQ9 QUESTIONS 1 AND 2: 0
1. LITTLE INTEREST OR PLEASURE IN DOING THINGS: NOT AT ALL

## 2024-08-06 ASSESSMENT — TONOMETRY
IOP_METHOD: GOLDMANN APPLANATION
OD_IOP_MMHG: 11

## 2024-08-06 ASSESSMENT — EXTERNAL EXAM - LEFT EYE: OS_EXAM: NORMAL

## 2024-08-06 ASSESSMENT — SLIT LAMP EXAM - LIDS
COMMENTS: NORMAL
COMMENTS: NORMAL

## 2024-08-06 NOTE — ASSESSMENT & PLAN NOTE
Can Dr Enrique please review lab work with patient from Dr Scruggs? Please see message below   Stable appearing intraocular lens (IOL) right eye (OD), will monitor with serial exam.

## 2024-08-06 NOTE — PATIENT INSTRUCTIONS
1 week Post-operative instructions for Cataract Surgery  Valeriano Martinez MD      Surgical eye: Right      Restrictions:  Continue to avoid rubbing or pressing on the eye(s)  May resume normal activity/bathing but if any issues please call  May discontinue plastic shield while sleeping/napping    Drop instructions:  STOP Ciprofloxacin (tan)  Prednisolone (pink)three times daily to the Right eye for 1 week, then two times daily for 1 week, then one time daily for 1 week, then STOP  Please shake prednisolone drops, have a milky appearance. If you can't remember which to shake, it is ok to shake them all    For the fellow eye (Left):  If you have upcoming surgery:   Begin Ciprofloxacin (tan)three times daily starting three days PRIOR to your surgery date    Wait several minutes after instillation of any drop before applying the next    Any concerns, issues, or questions please call our office  Please report any significantly increased redness/pain or any big vision changes    ----------------------------------------------------------------------------------------------------------------------    Thank you so much for choosing me to provide your care today!    If you were dilated your vision may remain blurry   or light sensitive for several hours.    The nature of eye and vision problems can require frequent follow up, please make every effort to adhere to any future appointments.    If you have any issues, questions, or concerns,   please do not hesitate to reach out.    If you receive a survey in regards to your care today, please mention any exceptional care my office staff and/or technicians provided.    You can reach our office at this number:  [phone]

## 2024-08-06 NOTE — PROGRESS NOTES
Assessment/Plan   Problem List Items Addressed This Visit       Combined forms of age-related cataract of left eye    Relevant Medications    ciprofloxacin (Ciloxan) 0.3 % ophthalmic solution    Cataract extraction status of right eye - Primary     Vision not as sharp on visual acuity (VA) at 1 week but noting no change per patient. Will have begin drop taper and advised OK to use lubricants as needed. Will see back for full post op once left eye (OS) addressed.          Relevant Medications    prednisoLONE acetate (Pred-Forte) 1 % ophthalmic suspension    Pseudophakia of right eye     Stable appearing intraocular lens (IOL) right eye (OD), will monitor with serial exam.             Provided reassurance regarding above diagnoses and care received in the office visit today. Discussed outcomes and options along with the importance of treatment compliance. Understands the importance of any follow up visits. Patient instructed to call/communicate with our office if any new issues, questions, or concerns.     Will plan to see back in post op OS or sooner PRN

## 2024-08-13 ENCOUNTER — APPOINTMENT (OUTPATIENT)
Dept: PRIMARY CARE | Facility: CLINIC | Age: 64
End: 2024-08-13
Payer: COMMERCIAL

## 2024-08-19 ENCOUNTER — ANESTHESIA (OUTPATIENT)
Dept: OPERATING ROOM | Facility: CLINIC | Age: 64
End: 2024-08-19
Payer: COMMERCIAL

## 2024-08-19 ENCOUNTER — HOSPITAL ENCOUNTER (OUTPATIENT)
Facility: CLINIC | Age: 64
Setting detail: OUTPATIENT SURGERY
Discharge: HOME | End: 2024-08-19
Attending: OPHTHALMOLOGY | Admitting: OPHTHALMOLOGY
Payer: COMMERCIAL

## 2024-08-19 ENCOUNTER — PREP FOR PROCEDURE (OUTPATIENT)
Dept: OPHTHALMOLOGY | Facility: CLINIC | Age: 64
End: 2024-08-19

## 2024-08-19 VITALS
DIASTOLIC BLOOD PRESSURE: 72 MMHG | SYSTOLIC BLOOD PRESSURE: 133 MMHG | OXYGEN SATURATION: 97 % | HEIGHT: 65 IN | BODY MASS INDEX: 36 KG/M2 | RESPIRATION RATE: 18 BRPM | HEART RATE: 76 BPM | WEIGHT: 216.05 LBS | TEMPERATURE: 97.7 F

## 2024-08-19 DIAGNOSIS — H25.812 COMBINED FORMS OF AGE-RELATED CATARACT OF LEFT EYE: Primary | ICD-10-CM

## 2024-08-19 PROCEDURE — A66984 PR REMV CATARACT EXTRACAP,INSERT LENS: Performed by: ANESTHESIOLOGY

## 2024-08-19 PROCEDURE — 7100000010 HC PHASE TWO TIME - EACH INCREMENTAL 1 MINUTE: Performed by: OPHTHALMOLOGY

## 2024-08-19 PROCEDURE — 2760000001 HC OR 276 NO HCPCS: Performed by: OPHTHALMOLOGY

## 2024-08-19 PROCEDURE — A66984 PR REMV CATARACT EXTRACAP,INSERT LENS: Performed by: REGISTERED NURSE

## 2024-08-19 PROCEDURE — 66984 XCAPSL CTRC RMVL W/O ECP: CPT | Performed by: OPHTHALMOLOGY

## 2024-08-19 PROCEDURE — 2500000001 HC RX 250 WO HCPCS SELF ADMINISTERED DRUGS (ALT 637 FOR MEDICARE OP): Performed by: OPHTHALMOLOGY

## 2024-08-19 PROCEDURE — 3600000003 HC OR TIME - INITIAL BASE CHARGE - PROCEDURE LEVEL THREE: Performed by: OPHTHALMOLOGY

## 2024-08-19 PROCEDURE — A4649 SURGICAL SUPPLIES: HCPCS | Performed by: OPHTHALMOLOGY

## 2024-08-19 PROCEDURE — 2500000004 HC RX 250 GENERAL PHARMACY W/ HCPCS (ALT 636 FOR OP/ED): Performed by: REGISTERED NURSE

## 2024-08-19 PROCEDURE — 2500000005 HC RX 250 GENERAL PHARMACY W/O HCPCS: Performed by: OPHTHALMOLOGY

## 2024-08-19 PROCEDURE — 3700000001 HC GENERAL ANESTHESIA TIME - INITIAL BASE CHARGE: Performed by: OPHTHALMOLOGY

## 2024-08-19 PROCEDURE — 7100000009 HC PHASE TWO TIME - INITIAL BASE CHARGE: Performed by: OPHTHALMOLOGY

## 2024-08-19 PROCEDURE — 3600000008 HC OR TIME - EACH INCREMENTAL 1 MINUTE - PROCEDURE LEVEL THREE: Performed by: OPHTHALMOLOGY

## 2024-08-19 PROCEDURE — 3700000002 HC GENERAL ANESTHESIA TIME - EACH INCREMENTAL 1 MINUTE: Performed by: OPHTHALMOLOGY

## 2024-08-19 PROCEDURE — C1780 LENS, INTRAOCULAR (NEW TECH): HCPCS | Performed by: OPHTHALMOLOGY

## 2024-08-19 PROCEDURE — 2720000007 HC OR 272 NO HCPCS: Performed by: OPHTHALMOLOGY

## 2024-08-19 DEVICE — ACRYSOF(R) IQ ASPHERIC IOL SP ACRYLIC FOLDABLELENS WULTRASERT(TM) DELIVERY SYSTEM UV WBLUE LIGHT FILTER. 13.0MM LENGTH 6.0MM ANTERIORASYMMETRIC BICONVEX OPTIC PLANAR HAPTICS.
Type: IMPLANTABLE DEVICE | Site: EYE | Status: FUNCTIONAL
Brand: ACRYSOF ULTRASERT

## 2024-08-19 RX ORDER — FENTANYL CITRATE 50 UG/ML
INJECTION, SOLUTION INTRAMUSCULAR; INTRAVENOUS AS NEEDED
Status: DISCONTINUED | OUTPATIENT
Start: 2024-08-19 | End: 2024-08-19

## 2024-08-19 RX ORDER — SODIUM CHLORIDE, SODIUM LACTATE, POTASSIUM CHLORIDE, CALCIUM CHLORIDE 600; 310; 30; 20 MG/100ML; MG/100ML; MG/100ML; MG/100ML
100 INJECTION, SOLUTION INTRAVENOUS CONTINUOUS
Status: DISCONTINUED | OUTPATIENT
Start: 2024-08-19 | End: 2024-08-19 | Stop reason: HOSPADM

## 2024-08-19 RX ORDER — PHENYLEPHRINE HYDROCHLORIDE 25 MG/ML
1 SOLUTION/ DROPS OPHTHALMIC
Status: CANCELLED | OUTPATIENT
Start: 2024-08-19 | End: 2024-08-19

## 2024-08-19 RX ORDER — LIDOCAINE HYDROCHLORIDE 10 MG/ML
0.1 INJECTION, SOLUTION EPIDURAL; INFILTRATION; INTRACAUDAL; PERINEURAL ONCE
Status: DISCONTINUED | OUTPATIENT
Start: 2024-08-19 | End: 2024-08-19 | Stop reason: HOSPADM

## 2024-08-19 RX ORDER — MOXIFLOXACIN 5 MG/ML
SOLUTION/ DROPS OPHTHALMIC AS NEEDED
Status: DISCONTINUED | OUTPATIENT
Start: 2024-08-19 | End: 2024-08-19 | Stop reason: HOSPADM

## 2024-08-19 RX ORDER — HYDROMORPHONE HYDROCHLORIDE 0.2 MG/ML
0.2 INJECTION INTRAMUSCULAR; INTRAVENOUS; SUBCUTANEOUS EVERY 5 MIN PRN
Status: DISCONTINUED | OUTPATIENT
Start: 2024-08-19 | End: 2024-08-19 | Stop reason: HOSPADM

## 2024-08-19 RX ORDER — MIDAZOLAM HYDROCHLORIDE 1 MG/ML
INJECTION, SOLUTION INTRAMUSCULAR; INTRAVENOUS AS NEEDED
Status: DISCONTINUED | OUTPATIENT
Start: 2024-08-19 | End: 2024-08-19

## 2024-08-19 RX ORDER — LIDOCAINE HYDROCHLORIDE 10 MG/ML
INJECTION INFILTRATION; PERINEURAL AS NEEDED
Status: DISCONTINUED | OUTPATIENT
Start: 2024-08-19 | End: 2024-08-19 | Stop reason: HOSPADM

## 2024-08-19 RX ORDER — TROPICAMIDE 10 MG/ML
1 SOLUTION/ DROPS OPHTHALMIC
Status: COMPLETED | OUTPATIENT
Start: 2024-08-19 | End: 2024-08-19

## 2024-08-19 RX ORDER — TETRACAINE HYDROCHLORIDE 5 MG/ML
SOLUTION OPHTHALMIC AS NEEDED
Status: DISCONTINUED | OUTPATIENT
Start: 2024-08-19 | End: 2024-08-19 | Stop reason: HOSPADM

## 2024-08-19 RX ORDER — METOCLOPRAMIDE HYDROCHLORIDE 5 MG/ML
10 INJECTION INTRAMUSCULAR; INTRAVENOUS ONCE AS NEEDED
Status: DISCONTINUED | OUTPATIENT
Start: 2024-08-19 | End: 2024-08-19 | Stop reason: HOSPADM

## 2024-08-19 RX ORDER — MIDAZOLAM HYDROCHLORIDE 1 MG/ML
1 INJECTION, SOLUTION INTRAMUSCULAR; INTRAVENOUS ONCE AS NEEDED
Status: DISCONTINUED | OUTPATIENT
Start: 2024-08-19 | End: 2024-08-19 | Stop reason: HOSPADM

## 2024-08-19 RX ORDER — PREDNISOLONE ACETATE 10 MG/ML
SUSPENSION/ DROPS OPHTHALMIC AS NEEDED
Status: DISCONTINUED | OUTPATIENT
Start: 2024-08-19 | End: 2024-08-19 | Stop reason: HOSPADM

## 2024-08-19 RX ORDER — ONDANSETRON HYDROCHLORIDE 2 MG/ML
INJECTION, SOLUTION INTRAVENOUS AS NEEDED
Status: DISCONTINUED | OUTPATIENT
Start: 2024-08-19 | End: 2024-08-19

## 2024-08-19 RX ORDER — OXYCODONE AND ACETAMINOPHEN 5; 325 MG/1; MG/1
1 TABLET ORAL EVERY 4 HOURS PRN
Status: DISCONTINUED | OUTPATIENT
Start: 2024-08-19 | End: 2024-08-19 | Stop reason: HOSPADM

## 2024-08-19 RX ORDER — PHENYLEPHRINE HYDROCHLORIDE 25 MG/ML
1 SOLUTION/ DROPS OPHTHALMIC
Status: COMPLETED | OUTPATIENT
Start: 2024-08-19 | End: 2024-08-19

## 2024-08-19 RX ORDER — TROPICAMIDE 10 MG/ML
1 SOLUTION/ DROPS OPHTHALMIC
Status: CANCELLED | OUTPATIENT
Start: 2024-08-19 | End: 2024-08-19

## 2024-08-19 RX ORDER — SODIUM CHLORIDE 0.9 % (FLUSH) 0.9 %
SYRINGE (ML) INJECTION AS NEEDED
Status: DISCONTINUED | OUTPATIENT
Start: 2024-08-19 | End: 2024-08-19

## 2024-08-19 ASSESSMENT — PAIN SCALES - GENERAL
PAINLEVEL_OUTOF10: 1
PAIN_LEVEL: 0
PAINLEVEL_OUTOF10: 1

## 2024-08-19 NOTE — ANESTHESIA PREPROCEDURE EVALUATION
Patient: Alondra Magaña    Procedure Information       Date/Time: 08/19/24 1130    Procedure: Extraction Cataract with Insertion Intraocular Lens (Left)    [place] [facility] [place] [facility] [place] [facility] [place] [facility] [place]    Surgeons: Benjamin Martinez MD            Relevant Problems   Anesthesia   (+) PONV (postoperative nausea and vomiting)      Pulmonary   (+) Obstructive sleep apnea      Neuro   (+) Generalized anxiety disorder with panic attacks   (+) Major depressive disorder, recurrent episode, moderate (Multi)      GI   (+) Esophageal reflux      Endocrine   (+) Class 2 severe obesity due to excess calories with serious comorbidity and body mass index (BMI) of 36.0 to 36.9 in adult (Multi)   (+) Controlled type 2 diabetes mellitus without complication, without long-term current use of insulin (Multi)       Clinical information reviewed:   Tobacco  Allergies  Meds  Problems  Med Hx  Surg Hx   Fam Hx  Soc   Hx        NPO Detail:  NPO/Void Status  Date of Last Liquid: 08/18/24  Time of Last Liquid: 2330  Date of Last Solid: 08/18/24  Time of Last Solid: 2330         Physical Exam    Airway  Mallampati: II  TM distance: >3 FB  Neck ROM: full     Cardiovascular - normal exam     Dental - normal exam     Pulmonary - normal exam     Abdominal - normal exam  (+) obese             Anesthesia Plan    History of general anesthesia?: yes  History of complications of general anesthesia?: no    ASA 3     MAC     intravenous induction   Anesthetic plan and risks discussed with patient.    Plan discussed with CRNA.

## 2024-08-19 NOTE — ANESTHESIA POSTPROCEDURE EVALUATION
Patient: Alondra Magaña    Procedure Summary       Date: 08/19/24 Room / Location: [facility] [place] [facility] / [facility] [place] [facility]    Anesthesia Start: 1206 Anesthesia Stop: 1314    Procedure: Extraction Cataract with Insertion Intraocular Lens (Left) Diagnosis:       Combined forms of age-related cataract of left eye      (Combined forms of age-related cataract of left eye [H25.812])    Surgeons: Benjamin Martinez MD Responsible Provider: Altagracia Izaguirre MD    Anesthesia Type: MAC ASA Status: 3            Anesthesia Type: MAC    Vitals Value Taken Time   /72 08/19/24 1320   Temp 36.5 °C (97.7 °F) 08/19/24 1320   Pulse 76 08/19/24 1320   Resp 18 08/19/24 1320   SpO2 97 % 08/19/24 1320       Anesthesia Post Evaluation    Patient location during evaluation: PACU  Patient participation: complete - patient participated  Level of consciousness: awake  Pain score: 0  Pain management: adequate  Airway patency: patent  Cardiovascular status: acceptable  Respiratory status: acceptable  Hydration status: acceptable  Postoperative Nausea and Vomiting: none        There were no known notable events for this encounter.

## 2024-08-19 NOTE — OP NOTE
Extraction Cataract with Insertion Intraocular Lens (L) Operative Note     Date: 2024  OR Location: [facility] OR    Name: Alondra Magaña, : 1960, Age: 63 y.o., MRN: [id number], Sex: female    Diagnosis  Pre-op Diagnosis      * Combined forms of age-related cataract of left eye [H25.812] Post-op Diagnosis     * Combined forms of age-related cataract of left eye [H25.812]     Procedures  Extraction Cataract with Insertion Intraocular Lens  79297 - [place] XCAPSL CTRC RMVL INSJ IO LENS PROSTH W/O ECP      Surgeons      * Benjamin Martinez - Primary    Resident/Fellow/Other Assistant:  Surgeons and Role:     * Aaron Nguyen MD - Resident - Assisting    Procedure Summary  Anesthesia: Anesthesia type not filed in the log.  ASA: III  Anesthesia Staff: Anesthesiologist: Altagracia Izaguirre MD  CRNA: DONATO Rowe-CRNA  Estimated Blood Loss: Less than 1 mL  Intra-op Medications:   Administrations occurring from 1130 to 1300 on 24:   Medication Name Total Dose   balanced salts (BSS Plus) intraocular solution 15 mL   balanced salts (BSS) intraocular solution 400 mL   tetracaine (PF) 0.5 % ophthalmic solution 2 drop   prednisoLONE acetate (Pred-Forte) 1 % ophthalmic suspension 1 drop   lidocaine (Xylocaine) 10 mg/mL (1 %) injection 1 mL   chondroitin sulf-sod hyaluron (Duovisc) intraocular kit 0.5 mL   moxifloxacin (Vigamox) 0.5 % ophthalmic solution 1 drop              Anesthesia Record               Intraprocedure I/O Totals       None           Specimen: No specimens collected     Staff:   Circulator: Felicity Piedra Person: Demi Sahni Scrub: Yulissa         Drains and/or Catheters: * None in log *    Tourniquet Times:         Implants:  Implants       Type Name Action Serial No.      Lens LENS, INTRAOCULAR, AU00T0 21.5D - [id number] Implanted [id number]              Findings: Unremarkable, as below    Indications: Alondra Magaña is an 63 y.o. female who is having surgery for Combined forms of  age-related cataract of left eye [H25.812].  Preoperatively noted blurry vision over time, has stopped driving at night due to vision difficulties.  Best corrected visual acuity of the operative eyes 20/50.  Ophthalmoscopy showed no findings consistent with decreased vision beyond cataract.  Risks and benefits were discussed with the patient.  Improvement of vision is still the expected outcome.  Treatment and exams are expected in the postoperative period.  Patient was judged to be in stable condition undergo cataract surgery.    The patient was seen in the preoperative area. The risks, benefits, complications, treatment options, non-operative alternatives, expected recovery and outcomes were discussed with the patient. The possibilities of reaction to medication, pulmonary aspiration, injury to surrounding structures, bleeding, recurrent infection, the need for additional procedures, failure to diagnose a condition, and creating a complication requiring transfusion or operation were discussed with the patient. The patient concurred with the proposed plan, giving informed consent.  The site of surgery was properly noted/marked if necessary per policy. The patient has been actively warmed in preoperative area. Preoperative antibiotics are not indicated. Venous thrombosis prophylaxis are not indicated.    Procedure Details: The patient was taken to the OR and during monitored anesthesia care with topical anesthesia of the left eye, the left eye was prepped and draped in usual sterile fashion for cataract extraction.  A lid speculum was placed into the left eye and the operating microscope was brought into proper position.  A paracentesis was placed with a 15 degree blade at the 1 o'clock position and the anterior chamber was then filled with intracameral lidocaine followed by viscoelastic until full.  A 3 planar clear corneal incision was then created using a crescent blade followed by a keratome at the 5 o'clock  position.  Continuous capsulorrhexis was then created using a bent cystitome and Utrata forceps.  Hydrodissection was performed successfully between the cortex and capsular bag with clear fluid waves visible.  Phacoemulsification was then undertaken with removal of the entire lens nucleus.  The remaining cortex was aspirated from the left eye using gentle irrigation and aspiration.  At this point, the capsular bag was again filled with viscoelastic and AU00T0 +21.5 diopter lens was placed into the posterior chamber in the capsular bag.  Any remaining viscoelastic was then removed from the left eye using gentle irrigation and aspiration.  BSS was used to fill the anterior chamber and hydrate the wounds which were found to be self-sealing.  The lid speculum was removed from the left eye in the anterior chamber remained well-formed.  Moxifloxacin and prednisolone acetate were then instilled onto the surface of the left eye followed by a clear shield.  The patient was taken to the postoperative area in good condition.  Postoperative instructions were reviewed with the patient prior to discharge including calling my office or answering service overnight.  The patient was told to follow up in the office with me the next day.  Estimated blood loss less than 1 cubic centimeter.  IV fluids in urine output as per any anesthesia notes.  No specimens were taken and the lens prosthesis was placed.  There were no complications and the findings are unremarkable.  Complications:  None; patient tolerated the procedure well.    Disposition: PACU - hemodynamically stable.  Condition: stable         Additional Details: CDE of 7.85    Attending Attestation: I performed the procedure.    Benjamin Martinez  Phone Number: [phone]

## 2024-08-19 NOTE — H&P
History Of Present Illness  Alondra Magaña is a 63 y.o. female presenting with combined age-related cataract of the left eye, status post (s/p) uncomplicated CEIOL OD 7/29/24, presenting for planned surgery of the left eye.     Past Medical History  Past Medical History:   Diagnosis Date    Anxiety     Combined forms of age-related cataract of right eye 06/14/2024    Depression     Fracture of unspecified part of left clavicle, initial encounter for closed fracture 01/21/2019    Fracture of left clavicle    Personal history of other diseases of the respiratory system 01/18/2017    History of bronchitis    Sleep apnea     Type 2 diabetes mellitus with hyperglycemia (Multi) 08/18/2020    Uncontrolled type 2 diabetes mellitus with hyperglycemia       Surgical History  Past Surgical History:   Procedure Laterality Date    CATARACT EXTRACTION Right 07/29/2024    AU00T0 +21.5    COLPOSCOPY      OTHER SURGICAL HISTORY  11/19/2020    Cholecystectomy    OTHER SURGICAL HISTORY  11/19/2020    Bariatric surgery    OTHER SURGICAL HISTORY  11/19/2020    Open reduction-internal fixation        Social History  She reports that she has never smoked. She has never been exposed to tobacco smoke. She has never used smokeless tobacco. She reports that she does not drink alcohol and does not use drugs.    Family History  Family History   Problem Relation Name Age of Onset    Colon cancer Maternal Grandfather          Allergies  Nsaids (non-steroidal anti-inflammatory drug), Shellfish containing products, and Shellfish derived    Review of Systems  Constitutional:  Negative for fever.   Eyes:  Negative for pain, discharge and redness.   Respiratory:  Negative for cough and shortness of breath.    Cardiovascular:  Negative for chest pain.   Gastrointestinal:  Negative for abdominal pain and vomiting.     Physical Exam  Constitutional:       General: Not in acute distress.     Appearance: Normal appearance, not ill-appearing.   HENT:       Head: Normocephalic and atraumatic.   Cardiovascular:      Comments: Appears to be perfusing extremities adequately  Pulmonary:      Effort: Pulmonary effort is normal. No respiratory distress.   Abdominal:      General: There is no distension.   Musculoskeletal:         General: Normal range of motion.   Skin:     General: Skin is warm and dry.   Neurological:      General: No focal deficit present.      Mental Status: Alert.   Psychiatric:         Mood and Affect: Mood normal.         Behavior: Behavior normal.     Last Recorded Vitals  There were no vitals taken for this visit.       Assessment/Plan   Assessment & Plan  Combined forms of age-related cataract of left eye    Alondra Magaña is a 63 y.o. female with combined age-related cataract of the left eye presenting for cataract extraction and insertion of intraocular lens of the left eye.             Aaron Nguyen MD

## 2024-08-20 ENCOUNTER — OFFICE VISIT (OUTPATIENT)
Dept: OPHTHALMOLOGY | Facility: CLINIC | Age: 64
End: 2024-08-20
Payer: COMMERCIAL

## 2024-08-20 DIAGNOSIS — Z98.42 CATARACT EXTRACTION STATUS OF LEFT EYE: Primary | ICD-10-CM

## 2024-08-20 DIAGNOSIS — Z98.41 CATARACT EXTRACTION STATUS OF RIGHT EYE: ICD-10-CM

## 2024-08-20 DIAGNOSIS — H43.813 PVD (POSTERIOR VITREOUS DETACHMENT), BILATERAL: Chronic | ICD-10-CM

## 2024-08-20 DIAGNOSIS — Z96.1 BILATERAL PSEUDOPHAKIA: ICD-10-CM

## 2024-08-20 PROBLEM — H25.812 COMBINED FORMS OF AGE-RELATED CATARACT OF LEFT EYE: Status: RESOLVED | Noted: 2024-06-14 | Resolved: 2024-08-20

## 2024-08-20 PROCEDURE — 99211 OFF/OP EST MAY X REQ PHY/QHP: CPT | Performed by: OPHTHALMOLOGY

## 2024-08-20 RX ORDER — CIPROFLOXACIN HYDROCHLORIDE 3 MG/ML
SOLUTION/ DROPS OPHTHALMIC
Qty: 5 ML | Refills: 0 | Status: SHIPPED | OUTPATIENT
Start: 2024-08-20

## 2024-08-20 RX ORDER — PREDNISOLONE ACETATE 10 MG/ML
SUSPENSION/ DROPS OPHTHALMIC
Qty: 5 ML | Refills: 0 | Status: SHIPPED | OUTPATIENT
Start: 2024-08-20

## 2024-08-20 ASSESSMENT — VISUAL ACUITY
METHOD: SNELLEN - LINEAR
OS_SC: 20/30
OD_SC+: -2
OS_SC+: -2
OD_SC: 20/30

## 2024-08-20 ASSESSMENT — ENCOUNTER SYMPTOMS
CONSTITUTIONAL NEGATIVE: 0
GASTROINTESTINAL NEGATIVE: 0
EYES NEGATIVE: 0
ENDOCRINE NEGATIVE: 0
ALLERGIC/IMMUNOLOGIC NEGATIVE: 0
PSYCHIATRIC NEGATIVE: 0
CARDIOVASCULAR NEGATIVE: 0
MUSCULOSKELETAL NEGATIVE: 0
HEMATOLOGIC/LYMPHATIC NEGATIVE: 0
NEUROLOGICAL NEGATIVE: 0
RESPIRATORY NEGATIVE: 0

## 2024-08-20 ASSESSMENT — CUP TO DISC RATIO: OD_RATIO: 0.3

## 2024-08-20 ASSESSMENT — TONOMETRY
OS_IOP_MMHG: 12
IOP_METHOD: GOLDMANN APPLANATION
OD_IOP_MMHG: 12
IOP_METHOD: GOLDMANN APPLANATION

## 2024-08-20 ASSESSMENT — EXTERNAL EXAM - LEFT EYE: OS_EXAM: NORMAL

## 2024-08-20 ASSESSMENT — PAIN SCALES - GENERAL: PAINLEVEL: 0-NO PAIN

## 2024-08-20 ASSESSMENT — EXTERNAL EXAM - RIGHT EYE: OD_EXAM: NORMAL

## 2024-08-20 ASSESSMENT — SLIT LAMP EXAM - LIDS
COMMENTS: NORMAL
COMMENTS: NORMAL

## 2024-08-20 NOTE — PATIENT INSTRUCTIONS
1 day Post-operative instructions for Cataract Surgery  Valeriano Martinez MD    Date of surgery: 8/19/24  Surgical eye: Left    *Please keep the included lens card for your records    OK to resume normal medications you may have stopped prior to surgery    Restrictions:  Do not rub the eye  Avoid heavy lifting or straining, especially while bending over  Wear plastic shield while sleeping/napping  Apply shield over eye and use included tape from forehead to cheek across edge of shield  Please avoid any water getting into the operative eye, including bathing or swimming    Care instructions:  You may wash your eye gently with a warm moistened wash cloth. Please keep your eye shut and avoid heavy pressure. Avoid water getting directly into the eye.   You may shower or bathe as normal. If bathing, close eye and keep dry wash cloth over eye during washing/rinsing hair.    Drop instructions:  Ciprofloxacin (tan)four times daily to the Left eye  Prednisolone (pink)four times daily to the Left eye  Please shake prednisolone drops, have a milky appearance. If you can't remember which to shake, it is ok to shake them all    For the fellow eye (Right):  Proceed with Prednisolone use 2 times daily    Wait several minutes after instillation of any drop before applying the next    Any concerns, issues, or questions please call our office  Please report any significantly increased redness/pain or any big vision changes    ----------------------------------------------------------------------------------------------------------------------    Thank you so much for choosing me to provide your care today!    If you were dilated your vision may remain blurry   or light sensitive for several hours.    The nature of eye and vision problems can require frequent follow up, please make every effort to adhere to any future appointments.    If you have any issues, questions, or concerns,   please do not hesitate to reach out.    If you  receive a survey in regards to your care today, please mention any exceptional care my office staff and/or technicians provided.    You can reach our office at this number:  [phone]

## 2024-08-20 NOTE — ASSESSMENT & PLAN NOTE
Doing well 1 day post op left eye (OS). Discussed drops and precautions. See back in 1 week for post op or sooner PRN.

## 2024-08-20 NOTE — PROGRESS NOTES
Assessment/Plan   Problem List Items Addressed This Visit       PVD (posterior vitreous detachment), bilateral (Chronic)     More prominent PVD noted on exam today right eye (OD), discussed warning signs of retinal detachment (RD) and will monitor with serial exam.          Cataract extraction status of right eye     Continues to heal well, will have continue drop taper. Restart pred as had discontinued         Relevant Medications    prednisoLONE acetate (Pred-Forte) 1 % ophthalmic suspension    Bilateral pseudophakia     Stable appearing intraocular lens (IOL) right eye (OD), will monitor with serial exam.          Cataract extraction status of left eye - Primary     Doing well 1 day post op left eye (OS). Discussed drops and precautions. See back in 1 week for post op or sooner PRN.          Relevant Medications    ciprofloxacin (Ciloxan) 0.3 % ophthalmic solution    prednisoLONE acetate (Pred-Forte) 1 % ophthalmic suspension       Provided reassurance regarding above diagnoses and care received in the office visit today. Discussed outcomes and options along with the importance of treatment compliance. Understands the importance of any follow up visits. Patient instructed to call/communicate with our office if any new issues, questions, or concerns.     Will plan to see back in 1 week post op or sooner PRN

## 2024-08-20 NOTE — ASSESSMENT & PLAN NOTE
More prominent PVD noted on exam today right eye (OD), discussed warning signs of retinal detachment (RD) and will monitor with serial exam.    Hide Additional Notes?: No Detail Level: Simple

## 2024-08-28 ENCOUNTER — OFFICE VISIT (OUTPATIENT)
Dept: OPHTHALMOLOGY | Facility: CLINIC | Age: 64
End: 2024-08-28
Payer: COMMERCIAL

## 2024-08-28 DIAGNOSIS — Z98.42 CATARACT EXTRACTION STATUS OF LEFT EYE: Primary | ICD-10-CM

## 2024-08-28 DIAGNOSIS — Z98.41 CATARACT EXTRACTION STATUS OF RIGHT EYE: ICD-10-CM

## 2024-08-28 DIAGNOSIS — Z96.1 BILATERAL PSEUDOPHAKIA: ICD-10-CM

## 2024-08-28 PROCEDURE — 99211 OFF/OP EST MAY X REQ PHY/QHP: CPT | Performed by: OPHTHALMOLOGY

## 2024-08-28 RX ORDER — PREDNISOLONE ACETATE 10 MG/ML
SUSPENSION/ DROPS OPHTHALMIC
Start: 2024-08-28

## 2024-08-28 ASSESSMENT — VISUAL ACUITY
OD_SC: 20/25
OS_SC: 20/30
OS_SC+: -2
OD_SC+: -2
METHOD: SNELLEN - SINGLE

## 2024-08-28 ASSESSMENT — ENCOUNTER SYMPTOMS
CARDIOVASCULAR NEGATIVE: 0
PSYCHIATRIC NEGATIVE: 0
NEUROLOGICAL NEGATIVE: 0
GASTROINTESTINAL NEGATIVE: 0
HEMATOLOGIC/LYMPHATIC NEGATIVE: 0
ENDOCRINE NEGATIVE: 0
RESPIRATORY NEGATIVE: 0
EYES NEGATIVE: 0
MUSCULOSKELETAL NEGATIVE: 0
ALLERGIC/IMMUNOLOGIC NEGATIVE: 0
CONSTITUTIONAL NEGATIVE: 0

## 2024-08-28 ASSESSMENT — SLIT LAMP EXAM - LIDS
COMMENTS: NORMAL
COMMENTS: NORMAL

## 2024-08-28 ASSESSMENT — TONOMETRY
IOP_METHOD: GOLDMANN APPLANATION
OS_IOP_MMHG: 12
OD_IOP_MMHG: 11

## 2024-08-28 ASSESSMENT — EXTERNAL EXAM - LEFT EYE: OS_EXAM: NORMAL

## 2024-08-28 ASSESSMENT — PAIN SCALES - GENERAL: PAINLEVEL: 0-NO PAIN

## 2024-08-28 ASSESSMENT — PATIENT HEALTH QUESTIONNAIRE - PHQ9
2. FEELING DOWN, DEPRESSED OR HOPELESS: NOT AT ALL
1. LITTLE INTEREST OR PLEASURE IN DOING THINGS: NOT AT ALL
SUM OF ALL RESPONSES TO PHQ9 QUESTIONS 1 AND 2: 0

## 2024-08-28 ASSESSMENT — EXTERNAL EXAM - RIGHT EYE: OD_EXAM: NORMAL

## 2024-08-28 NOTE — PATIENT INSTRUCTIONS
1 week Post-operative instructions for Cataract Surgery  Valeriano Martinez MD      Surgical eye: Left      Restrictions:  Continue to avoid rubbing or pressing on the eye(s)  May resume normal activity/bathing but if any issues please call  May discontinue plastic shield while sleeping/napping    Drop instructions:  STOP Ciprofloxacin (tan)  Prednisolone (pink)three times daily to the Left eye for 1 week, then two times daily for 1 week, then one time daily for 1 week, then STOP  Please shake prednisolone drops, have a milky appearance. If you can't remember which to shake, it is ok to shake them all    For the fellow eye (Right):  Proceed with Prednisolone (pink) one time daily for 1 week, then STOP    Wait several minutes after instillation of any drop before applying the next    Any concerns, issues, or questions please call our office  Please report any significantly increased redness/pain or any big vision changes    ----------------------------------------------------------------------------------------------------------------------    Thank you so much for choosing me to provide your care today!    If you were dilated your vision may remain blurry   or light sensitive for several hours.    The nature of eye and vision problems can require frequent follow up, please make every effort to adhere to any future appointments.    If you have any issues, questions, or concerns,   please do not hesitate to reach out.    If you receive a survey in regards to your care today, please mention any exceptional care my office staff and/or technicians provided.    You can reach our office at this number:  [phone]

## 2024-08-28 NOTE — PROGRESS NOTES
Assessment/Plan   Problem List Items Addressed This Visit       Cataract extraction status of right eye     Continues to heal well, finish prednisolone taper.          Relevant Medications    prednisoLONE acetate (Pred-Forte) 1 % ophthalmic suspension    Bilateral pseudophakia     Stable appearing intraocular lens (IOL) right eye (OD), will monitor with serial exam.          Cataract extraction status of left eye - Primary     Continues to heal well at 1 week post op left eye (OS). Discussed drop taper and precautions. See back in 3 weeks for full post op.          Relevant Medications    prednisoLONE acetate (Pred-Forte) 1 % ophthalmic suspension       Provided reassurance regarding above diagnoses and care received in the office visit today. Discussed outcomes and options along with the importance of treatment compliance. Understands the importance of any follow up visits. Patient instructed to call/communicate with our office if any new issues, questions, or concerns.     Will plan to see back in 3 weeks full post op or sooner PRN

## 2024-08-28 NOTE — ASSESSMENT & PLAN NOTE
Continues to heal well at 1 week post op left eye (OS). Discussed drop taper and precautions. See back in 3 weeks for full post op.

## 2024-08-29 PROCEDURE — RXMED WILLOW AMBULATORY MEDICATION CHARGE

## 2024-08-30 ENCOUNTER — PHARMACY VISIT (OUTPATIENT)
Dept: PHARMACY | Facility: CLINIC | Age: 64
End: 2024-08-30
Payer: COMMERCIAL

## 2024-09-18 ENCOUNTER — APPOINTMENT (OUTPATIENT)
Dept: OPHTHALMOLOGY | Facility: CLINIC | Age: 64
End: 2024-09-18
Payer: COMMERCIAL

## 2024-09-18 DIAGNOSIS — Z98.42 CATARACT EXTRACTION STATUS OF LEFT EYE: Primary | ICD-10-CM

## 2024-09-18 DIAGNOSIS — Z96.1 BILATERAL PSEUDOPHAKIA: ICD-10-CM

## 2024-09-18 DIAGNOSIS — Z98.41 CATARACT EXTRACTION STATUS OF RIGHT EYE: ICD-10-CM

## 2024-09-18 PROCEDURE — 99211 OFF/OP EST MAY X REQ PHY/QHP: CPT | Performed by: OPHTHALMOLOGY

## 2024-09-18 ASSESSMENT — ENCOUNTER SYMPTOMS
MUSCULOSKELETAL NEGATIVE: 0
CONSTITUTIONAL NEGATIVE: 0
GASTROINTESTINAL NEGATIVE: 0
ALLERGIC/IMMUNOLOGIC NEGATIVE: 0
RESPIRATORY NEGATIVE: 0
HEMATOLOGIC/LYMPHATIC NEGATIVE: 0
EYES NEGATIVE: 0
ENDOCRINE NEGATIVE: 0
PSYCHIATRIC NEGATIVE: 0
CARDIOVASCULAR NEGATIVE: 0
NEUROLOGICAL NEGATIVE: 0

## 2024-09-18 ASSESSMENT — TONOMETRY
OS_IOP_MMHG: 12
IOP_METHOD: GOLDMANN APPLANATION
OD_IOP_MMHG: 11

## 2024-09-18 ASSESSMENT — PATIENT HEALTH QUESTIONNAIRE - PHQ9
1. LITTLE INTEREST OR PLEASURE IN DOING THINGS: NOT AT ALL
2. FEELING DOWN, DEPRESSED OR HOPELESS: NOT AT ALL
SUM OF ALL RESPONSES TO PHQ9 QUESTIONS 1 AND 2: 0

## 2024-09-18 ASSESSMENT — REFRACTION_MANIFEST
OD_CYLINDER: -1.00
OS_AXIS: 105
OD_AXIS: 080
OS_SPHERE: -0.25
OS_CYLINDER: -0.75
METHOD_AUTOREFRACTION: 1
OD_SPHERE: +0.25

## 2024-09-18 ASSESSMENT — EXTERNAL EXAM - RIGHT EYE: OD_EXAM: NORMAL

## 2024-09-18 ASSESSMENT — PAIN SCALES - GENERAL: PAINLEVEL: 0-NO PAIN

## 2024-09-18 ASSESSMENT — KERATOMETRY
OS_AXISANGLE2_DEGREES: 150
OS_K1POWER_DIOPTERS: 45.25
OS_K2POWER_DIOPTERS: 45.75
OD_AXISANGLE_DEGREES: 140
OD_K2POWER_DIOPTERS: 45.25
OD_AXISANGLE2_DEGREES: 50
OD_K1POWER_DIOPTERS: 45.00
OS_AXISANGLE_DEGREES: 60

## 2024-09-18 ASSESSMENT — VISUAL ACUITY
OS_SC: 20/25
METHOD: SNELLEN - LINEAR
OD_SC: 20/20

## 2024-09-18 ASSESSMENT — SLIT LAMP EXAM - LIDS
COMMENTS: NORMAL
COMMENTS: NORMAL

## 2024-09-18 ASSESSMENT — CUP TO DISC RATIO
OD_RATIO: 0.3
OS_RATIO: 0.3

## 2024-09-18 ASSESSMENT — EXTERNAL EXAM - LEFT EYE: OS_EXAM: NORMAL

## 2024-09-18 NOTE — PATIENT INSTRUCTIONS
Thank you so much for choosing me to provide your care today!    If you were dilated your vision may remain blurry   or light sensitive for several hours.    The nature of eye and vision problems can require frequent follow up, please make every effort to adhere to any future appointments.    If you have any issues, questions, or concerns,   please do not hesitate to reach out.    If you receive a survey in regards to your care today, please mention any exceptional care my office staff and/or technicians provided.    You can reach our office at this number:    [phone]    Please consider signing up for and utilizing [employer]!  This is the best way to directly reach me or other  providers

## 2024-09-18 NOTE — PROGRESS NOTES
Assessment/Plan   Problem List Items Addressed This Visit       Cataract extraction status of right eye     As per left eye (OS).          Bilateral pseudophakia     Stable appearing intraocular lens (IOL) right eye (OD), will monitor with serial exam.          Cataract extraction status of left eye - Primary     Well healing at final post op. Suspect some component of visual clarity is secondary to PVD/floaters and surface as well. Advised on regular lubrication but could consider filling full RX if interested. Check vision in 3 months or sooner.            Provided reassurance regarding above diagnoses and care received in the office visit today. Discussed outcomes and options along with the importance of treatment compliance. Understands the importance of any follow up visits. Patient instructed to call/communicate with our office if any new issues, questions, or concerns.     Will plan to see back in 3 months vision check or sooner PRN

## 2024-09-18 NOTE — ASSESSMENT & PLAN NOTE
OCCUPATIONAL THERAPY:  OT evaluation deferred. Chart reviewed and spoke with care team. No OT needs at this time. Pt independent with ADLs. Will d/c orders. Thank you.  Mary Reyes, OTR/L  4/28/2024       Well healing at final post op. Suspect some component of visual clarity is secondary to PVD/floaters and surface as well. Advised on regular lubrication but could consider filling full RX if interested. Check vision in 3 months or sooner.

## 2024-09-26 PROCEDURE — RXMED WILLOW AMBULATORY MEDICATION CHARGE

## 2024-09-27 ENCOUNTER — PHARMACY VISIT (OUTPATIENT)
Dept: PHARMACY | Facility: CLINIC | Age: 64
End: 2024-09-27
Payer: COMMERCIAL

## 2024-10-18 PROCEDURE — RXMED WILLOW AMBULATORY MEDICATION CHARGE

## 2024-10-21 ENCOUNTER — PHARMACY VISIT (OUTPATIENT)
Dept: PHARMACY | Facility: CLINIC | Age: 64
End: 2024-10-21
Payer: COMMERCIAL

## 2024-10-22 DIAGNOSIS — Z12.31 ENCOUNTER FOR SCREENING MAMMOGRAM FOR BREAST CANCER: ICD-10-CM

## 2024-11-01 ENCOUNTER — TELEPHONE (OUTPATIENT)
Dept: PRIMARY CARE | Facility: CLINIC | Age: 64
End: 2024-11-01
Payer: COMMERCIAL

## 2024-11-01 DIAGNOSIS — E11.9 CONTROLLED TYPE 2 DIABETES MELLITUS WITHOUT COMPLICATION, WITHOUT LONG-TERM CURRENT USE OF INSULIN (MULTI): Chronic | ICD-10-CM

## 2024-11-01 NOTE — TELEPHONE ENCOUNTER
Patient called and stated that the paharmacy told her she has no refills left on the following and we need to send in a new script for:  tirzepatide (Mounjaro) 5 mg/0.5 mL pen injector [[id number]]     Pharmacy:  [facility]     Please advise.

## 2024-11-04 RX ORDER — TIRZEPATIDE 5 MG/.5ML
5 INJECTION, SOLUTION SUBCUTANEOUS
Qty: 2 ML | Refills: 11 | Status: SHIPPED | OUTPATIENT
Start: 2024-11-10 | End: 2025-11-10

## 2024-11-19 ENCOUNTER — TELEPHONE (OUTPATIENT)
Dept: PRIMARY CARE | Facility: CLINIC | Age: 64
End: 2024-11-19
Payer: COMMERCIAL

## 2024-11-19 DIAGNOSIS — B37.31 VAGINA, CANDIDIASIS: Primary | ICD-10-CM

## 2024-11-19 RX ORDER — FLUCONAZOLE 150 MG/1
150 TABLET ORAL ONCE
Qty: 1 TABLET | Refills: 0 | Status: SHIPPED | OUTPATIENT
Start: 2024-11-19 | End: 2024-11-19

## 2024-11-19 RX ORDER — KETOCONAZOLE 20 MG/G
CREAM TOPICAL DAILY
COMMUNITY
End: 2024-11-19 | Stop reason: SDUPTHER

## 2024-11-19 RX ORDER — KETOCONAZOLE 20 MG/G
CREAM TOPICAL DAILY
Qty: 15 G | Refills: 0 | Status: SHIPPED | OUTPATIENT
Start: 2024-11-19

## 2024-11-19 NOTE — TELEPHONE ENCOUNTER
Called patient, notified of message, patient expressed verbal understanding.  Pt is requesting ketoconazole as well, as she is itching and that works for her.

## 2024-11-19 NOTE — TELEPHONE ENCOUNTER
Patient called in stating that she has a yeast infection that she has had for about a month. She is wondering if you can send something in for her. She is having itching, and some discharge    Pharmacy: [facility], [place]

## 2024-12-13 ENCOUNTER — TELEPHONE (OUTPATIENT)
Dept: OBSTETRICS AND GYNECOLOGY | Facility: CLINIC | Age: 64
End: 2024-12-13

## 2024-12-13 NOTE — TELEPHONE ENCOUNTER
Patient can be scheduled at first available office visit. As of now that looks to be 1/16/2025 at 1:40 , 2:20 or 2:40

## 2024-12-23 ENCOUNTER — APPOINTMENT (OUTPATIENT)
Dept: OPHTHALMOLOGY | Facility: CLINIC | Age: 64
End: 2024-12-23
Payer: COMMERCIAL

## 2024-12-23 DIAGNOSIS — Z96.1 BILATERAL PSEUDOPHAKIA: Primary | ICD-10-CM

## 2024-12-23 DIAGNOSIS — E11.9 CONTROLLED TYPE 2 DIABETES MELLITUS WITHOUT COMPLICATION, WITHOUT LONG-TERM CURRENT USE OF INSULIN (MULTI): Chronic | ICD-10-CM

## 2024-12-23 DIAGNOSIS — H52.7 REFRACTION ERROR: ICD-10-CM

## 2024-12-23 PROCEDURE — 99212 OFFICE O/P EST SF 10 MIN: CPT | Performed by: OPHTHALMOLOGY

## 2024-12-23 RX ORDER — PROPRANOLOL HYDROCHLORIDE 10 MG/1
TABLET ORAL
COMMUNITY
Start: 2024-11-07

## 2024-12-23 ASSESSMENT — VISUAL ACUITY
OS_SC+: -1
OD_SC+: -1
OS_SC: 20/25
METHOD: SNELLEN - SINGLE
OD_SC: 20/25

## 2024-12-23 ASSESSMENT — PAIN SCALES - GENERAL: PAINLEVEL_OUTOF10: 0-NO PAIN

## 2024-12-23 ASSESSMENT — SLIT LAMP EXAM - LIDS
COMMENTS: NORMAL
COMMENTS: NORMAL

## 2024-12-23 ASSESSMENT — EXTERNAL EXAM - RIGHT EYE: OD_EXAM: NORMAL

## 2024-12-23 ASSESSMENT — EXTERNAL EXAM - LEFT EYE: OS_EXAM: NORMAL

## 2024-12-23 NOTE — PATIENT INSTRUCTIONS
Thank you so much for choosing me to provide your care today!    If you were dilated your vision may remain blurry   or light sensitive for several hours.    The nature of eye and vision problems can require frequent follow up, please make every effort to adhere to any future appointments.    If you have any issues, questions, or concerns,   please do not hesitate to reach out.    If you receive a survey in regards to your care today, please mention any exceptional care my office staff and/or technicians provided.    You can reach our office at this number:    [phone]    Please consider signing up for and utilizing [employer]!  This is the best way to directly reach me or other  providers

## 2024-12-23 NOTE — ASSESSMENT & PLAN NOTE
Stable appearing intraocular lens (IOL) right eye (OD), no significant scar formation at this time, will monitor with serial exam.

## 2024-12-23 NOTE — ASSESSMENT & PLAN NOTE
New Rx for glasses/SCL given per patient request. Patient's signature obtained to acknowledge and confirm that a paper copy of glasses/SCL Rx was given to patient in compliance with [facility] Eyeglass Rule. Electronic copy of Rx will also be available via [employer]/EPIC.

## 2025-01-06 ENCOUNTER — TELEPHONE (OUTPATIENT)
Dept: PRIMARY CARE | Facility: CLINIC | Age: 65
End: 2025-01-06
Payer: MEDICARE

## 2025-01-10 DIAGNOSIS — E11.9 CONTROLLED TYPE 2 DIABETES MELLITUS WITHOUT COMPLICATION, WITHOUT LONG-TERM CURRENT USE OF INSULIN (MULTI): Primary | ICD-10-CM

## 2025-01-14 ENCOUNTER — APPOINTMENT (OUTPATIENT)
Dept: PHARMACY | Facility: HOSPITAL | Age: 65
End: 2025-01-14

## 2025-01-16 ENCOUNTER — APPOINTMENT (OUTPATIENT)
Dept: OBSTETRICS AND GYNECOLOGY | Facility: CLINIC | Age: 65
End: 2025-01-16

## 2025-01-24 ENCOUNTER — APPOINTMENT (OUTPATIENT)
Dept: OBSTETRICS AND GYNECOLOGY | Facility: CLINIC | Age: 65
End: 2025-01-24
Payer: MEDICARE

## 2025-01-24 VITALS
BODY MASS INDEX: 34.02 KG/M2 | HEIGHT: 65 IN | WEIGHT: 204.2 LBS | SYSTOLIC BLOOD PRESSURE: 132 MMHG | DIASTOLIC BLOOD PRESSURE: 82 MMHG

## 2025-01-24 DIAGNOSIS — Z01.419 ENCOUNTER FOR ANNUAL ROUTINE GYNECOLOGICAL EXAMINATION: ICD-10-CM

## 2025-01-24 DIAGNOSIS — Z12.11 COLON CANCER SCREENING: Primary | ICD-10-CM

## 2025-01-24 DIAGNOSIS — N76.3 CHRONIC VULVITIS: ICD-10-CM

## 2025-01-24 DIAGNOSIS — Z12.4 SCREENING FOR MALIGNANT NEOPLASM OF CERVIX: ICD-10-CM

## 2025-01-24 PROCEDURE — 87624 HPV HI-RISK TYP POOLED RSLT: CPT

## 2025-01-24 RX ORDER — CLOBETASOL PROPIONATE 0.5 MG/G
OINTMENT TOPICAL 2 TIMES DAILY
Qty: 45 G | Refills: 1 | Status: SHIPPED | OUTPATIENT
Start: 2025-01-24

## 2025-01-24 RX ORDER — LURASIDONE HYDROCHLORIDE 40 MG/1
40 TABLET, FILM COATED ORAL
COMMUNITY

## 2025-01-24 ASSESSMENT — PATIENT HEALTH QUESTIONNAIRE - PHQ9
1. LITTLE INTEREST OR PLEASURE IN DOING THINGS: NOT AT ALL
2. FEELING DOWN, DEPRESSED OR HOPELESS: SEVERAL DAYS
SUM OF ALL RESPONSES TO PHQ9 QUESTIONS 1 AND 2: 1

## 2025-01-24 ASSESSMENT — ENCOUNTER SYMPTOMS
LOSS OF SENSATION IN FEET: 0
OCCASIONAL FEELINGS OF UNSTEADINESS: 0
DEPRESSION: 0

## 2025-01-24 NOTE — PROGRESS NOTES
Subjective   Patient ID: Alondra Magaña is a 64 y.o. female who presents for Gynecologic Exam (Here for annual exam. Has history of +HPV. Been dealing with ongoing vaginal itching, internally and externally, for about 8 weeks. Has taken diflucan and ketoconazole but no relief. Has tried OTC steroid and diaper rash cream which helped with inflammation but not itching. ).  HPI 64 years old G0 presents for yearly exam.  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  She denies any vaginal bleeding but reports some vulvar itching.    Review of Systems   Genitourinary:         Chronic lumbar iching   All other systems reviewed and are negative.      Objective   Physical Exam  Vitals reviewed.   Constitutional:       Appearance: Normal appearance.   HENT:      Head: Normocephalic and atraumatic.      Nose: Nose normal.   Cardiovascular:      Rate and Rhythm: Normal rate and regular rhythm.   Pulmonary:      Effort: Pulmonary effort is normal.      Breath sounds: Normal breath sounds.   Chest:      Chest wall: No mass.   Breasts:     Right: Normal.      Left: Normal.   Abdominal:      General: Abdomen is flat. Bowel sounds are normal. There is no distension.      Palpations: Abdomen is soft. There is no mass.   Genitourinary:     General: Normal vulva.      Vagina: Normal.      Cervix: Normal.      Uterus: Normal.       Adnexa: Right adnexa normal and left adnexa normal.      Rectum: Normal.      Comments: Sin thickening, darening consistent with chronic irritation  Musculoskeletal:         General: Normal range of motion.      Cervical back: Normal range of motion.   Skin:     General: Skin is warm and dry.   Neurological:      General: No focal deficit present.      Mental Status: She is alert.   Psychiatric:         Mood and Affect: Mood normal.         Behavior: Behavior normal.         Assessment/Plan   Problem List Items Addressed This Visit    None  Visit Diagnoses          Codes    Colon cancer screening    -  Primary Z12.11    Relevant Orders    Referral to Gastroenterology    Screening for malignant neoplasm of cervix     Z12.4    Relevant Orders    THINPREP PAP TEST    Chronic vulvitis     N76.3    Encounter for annual routine gynecological examination     Z01.419        Pap smear was done today.  And I have encouraged her to avoid itching and scratching and recommended topical corticosteroid prescription for Temovate was E scribed for her and instruction were reviewed.  And I have encouraged her to self breast exam monthly, use sunscreen and prevent excessive sun exposure and to prevent skin cancer.  Have also recommended colon cancer screening.  Follow-up as needed or in 1 to 2 years.  She has an order for mammogram and needs to schedule this.  I have encouraged her to do regular exercises and eat healthy.  Calcium and vitamin D supplements encouraged.         Hoang Tobar MD 01/24/25 1:38 PM

## 2025-02-05 ENCOUNTER — TELEPHONE (OUTPATIENT)
Dept: PRIMARY CARE | Facility: CLINIC | Age: 65
End: 2025-02-05
Payer: MEDICARE

## 2025-02-05 DIAGNOSIS — R92.8 ABNORMAL SCREENING MAMMOGRAM: Primary | ICD-10-CM

## 2025-02-05 NOTE — TELEPHONE ENCOUNTER
Patient needs a new script for diagnostic mammogram not the screening one she will call tomorrow and schedule

## 2025-02-07 LAB
CYTOLOGY CMNT CVX/VAG CYTO-IMP: NORMAL
HPV HR 12 DNA GENITAL QL NAA+PROBE: NEGATIVE
HPV HR GENOTYPES PNL CVX NAA+PROBE: NEGATIVE
HPV16 DNA SPEC QL NAA+PROBE: NEGATIVE
HPV18 DNA SPEC QL NAA+PROBE: NEGATIVE
LAB AP HPV GENOTYPE QUESTION: YES
LAB AP HPV HR: NORMAL
LABORATORY COMMENT REPORT: NORMAL
PATH REPORT.RELEVANT HX SPEC: NORMAL
PATH REPORT.TOTAL CANCER: NORMAL

## 2025-02-18 ENCOUNTER — APPOINTMENT (OUTPATIENT)
Dept: RADIOLOGY | Facility: HOSPITAL | Age: 65
End: 2025-02-18
Payer: MEDICARE

## 2025-02-20 ENCOUNTER — HOSPITAL ENCOUNTER (OUTPATIENT)
Dept: RADIOLOGY | Facility: HOSPITAL | Age: 65
Discharge: HOME | End: 2025-02-20
Payer: MEDICARE

## 2025-02-20 VITALS — BODY MASS INDEX: 33.32 KG/M2 | WEIGHT: 200 LBS | HEIGHT: 65 IN

## 2025-02-20 DIAGNOSIS — R92.8 ABNORMAL SCREENING MAMMOGRAM: ICD-10-CM

## 2025-02-20 PROCEDURE — 77062 BREAST TOMOSYNTHESIS BI: CPT

## 2025-02-20 PROCEDURE — 76642 ULTRASOUND BREAST LIMITED: CPT | Mod: RT

## 2025-03-11 ENCOUNTER — OFFICE VISIT (OUTPATIENT)
Dept: PRIMARY CARE | Facility: CLINIC | Age: 65
End: 2025-03-11
Payer: MEDICARE

## 2025-03-11 VITALS
WEIGHT: 203 LBS | BODY MASS INDEX: 33.82 KG/M2 | OXYGEN SATURATION: 99 % | HEART RATE: 74 BPM | SYSTOLIC BLOOD PRESSURE: 121 MMHG | DIASTOLIC BLOOD PRESSURE: 83 MMHG | RESPIRATION RATE: 21 BRPM | TEMPERATURE: 97.2 F | HEIGHT: 65 IN

## 2025-03-11 DIAGNOSIS — E11.9 CONTROLLED TYPE 2 DIABETES MELLITUS WITHOUT COMPLICATION, WITHOUT LONG-TERM CURRENT USE OF INSULIN (MULTI): Primary | ICD-10-CM

## 2025-03-11 DIAGNOSIS — E66.811 CLASS 1 OBESITY WITH SERIOUS COMORBIDITY AND BODY MASS INDEX (BMI) OF 33.0 TO 33.9 IN ADULT, UNSPECIFIED OBESITY TYPE: Chronic | ICD-10-CM

## 2025-03-11 DIAGNOSIS — Z11.59 NEED FOR HEPATITIS C SCREENING TEST: ICD-10-CM

## 2025-03-11 PROBLEM — H02.834 DERMATOCHALASIS OF BOTH UPPER EYELIDS: Chronic | Status: RESOLVED | Noted: 2021-10-21 | Resolved: 2025-03-11

## 2025-03-11 PROBLEM — Z01.818 PRE-OP EVALUATION: Status: RESOLVED | Noted: 2024-07-15 | Resolved: 2025-03-11

## 2025-03-11 PROBLEM — M54.2 NECK PAIN: Status: RESOLVED | Noted: 2023-05-04 | Resolved: 2025-03-11

## 2025-03-11 PROBLEM — H02.831 DERMATOCHALASIS OF BOTH UPPER EYELIDS: Chronic | Status: RESOLVED | Noted: 2021-10-21 | Resolved: 2025-03-11

## 2025-03-11 PROCEDURE — 1036F TOBACCO NON-USER: CPT | Performed by: FAMILY MEDICINE

## 2025-03-11 PROCEDURE — 1157F ADVNC CARE PLAN IN RCRD: CPT | Performed by: FAMILY MEDICINE

## 2025-03-11 PROCEDURE — 1126F AMNT PAIN NOTED NONE PRSNT: CPT | Performed by: FAMILY MEDICINE

## 2025-03-11 PROCEDURE — 99213 OFFICE O/P EST LOW 20 MIN: CPT | Performed by: FAMILY MEDICINE

## 2025-03-11 PROCEDURE — 1159F MED LIST DOCD IN RCRD: CPT | Performed by: FAMILY MEDICINE

## 2025-03-11 PROCEDURE — 3074F SYST BP LT 130 MM HG: CPT | Performed by: FAMILY MEDICINE

## 2025-03-11 PROCEDURE — 3008F BODY MASS INDEX DOCD: CPT | Performed by: FAMILY MEDICINE

## 2025-03-11 PROCEDURE — 1123F ACP DISCUSS/DSCN MKR DOCD: CPT | Performed by: FAMILY MEDICINE

## 2025-03-11 PROCEDURE — 1160F RVW MEDS BY RX/DR IN RCRD: CPT | Performed by: FAMILY MEDICINE

## 2025-03-11 PROCEDURE — 3079F DIAST BP 80-89 MM HG: CPT | Performed by: FAMILY MEDICINE

## 2025-03-11 PROCEDURE — G2211 COMPLEX E/M VISIT ADD ON: HCPCS | Performed by: FAMILY MEDICINE

## 2025-03-11 RX ORDER — PROPRANOLOL HYDROCHLORIDE 10 MG/1
10 TABLET ORAL
COMMUNITY
Start: 2025-03-10

## 2025-03-11 SDOH — ECONOMIC STABILITY: FOOD INSECURITY: WITHIN THE PAST 12 MONTHS, YOU WORRIED THAT YOUR FOOD WOULD RUN OUT BEFORE YOU GOT MONEY TO BUY MORE.: NEVER TRUE

## 2025-03-11 SDOH — ECONOMIC STABILITY: FOOD INSECURITY: WITHIN THE PAST 12 MONTHS, THE FOOD YOU BOUGHT JUST DIDN'T LAST AND YOU DIDN'T HAVE MONEY TO GET MORE.: NEVER TRUE

## 2025-03-11 ASSESSMENT — LIFESTYLE VARIABLES
HOW OFTEN DO YOU HAVE SIX OR MORE DRINKS ON ONE OCCASION: NEVER
AUDIT-C TOTAL SCORE: 0
SKIP TO QUESTIONS 9-10: 1
HOW OFTEN DO YOU HAVE A DRINK CONTAINING ALCOHOL: NEVER
HOW MANY STANDARD DRINKS CONTAINING ALCOHOL DO YOU HAVE ON A TYPICAL DAY: PATIENT DOES NOT DRINK

## 2025-03-11 ASSESSMENT — ENCOUNTER SYMPTOMS
DIZZINESS: 1
OCCASIONAL FEELINGS OF UNSTEADINESS: 0
DEPRESSION: 1
LOSS OF SENSATION IN FEET: 0

## 2025-03-11 ASSESSMENT — PAIN SCALES - GENERAL: PAINLEVEL_OUTOF10: 0-NO PAIN

## 2025-03-11 NOTE — ASSESSMENT & PLAN NOTE
Orders:    Hemoglobin A1C; Future    Comprehensive Metabolic Panel; Future    Lipid Panel; Future    Albumin , Urine Random; Future    CBC; Future

## 2025-03-11 NOTE — PROGRESS NOTES
Subjective   Patient ID: Alondra Magaña is a 64 y.o. female who presents for Diabetes.  She is working with psychiatry for depression.  This has been an ongoing issue since she was 13.  She retired in May 2024 and felt lonely and isolated so now she is going to the  for exercise and is planning on possibly volunteering in the [employer] in the future.    Diabetes  She presents for her follow-up diabetic visit. She has type 2 diabetes mellitus. Her disease course has been stable. Hypoglycemia symptoms include dizziness. Risk factors for coronary artery disease include diabetes mellitus, post-menopausal and obesity. Current diabetic treatments: Mounjaro 5 mg. Her weight is decreasing steadily. She participates in exercise intermittently. Her breakfast blood glucose is taken between 8-9 am. Her breakfast blood glucose range is generally  mg/dl. An ACE inhibitor/angiotensin II receptor blocker is not being taken. Eye exam is current.       In addition to that documented in the HPI above, the additional ROS was obtained:  Constitutional: Denies fevers or chills  Eyes: Denies vision changes  ENMT: Denies trouble swallowing  Cardiovascular: Denies chest pain or heart racing  Respiratory: Denies SOB or cough  Gastrointestinal: Denies nausea, vomiting, diarrhea or constipation  Musculoskeletal: Denies joint pain or joint swelling  Neuro: denies frequent headaches or dizziness  Psych: positive for depression    Patient Care Team:  Masha Wallace DO as PCP - General  YASMIN Siddiqi as Nurse Practitioner (Obstetrics and Gynecology)  YASMIN Gale as Nurse Practitioner (Psychiatry)  Harmeet Hooks MD as Consulting Physician (Neurology)  Hoang Tobar MD as Consulting Physician (Obstetrics and Gynecology)  Benjamin Martinez MD as Consulting Physician (Ophthalmology)    Current Outpatient Medications   Medication Sig Dispense Refill    Accu-Chek Guide test strips strip       Accu-Chek  "Softclix Lancets misc       clobetasol (Temovate) 0.05 % ointment Apply topically 2 times a day. 45 g 1    ketoconazole (NIZOral) 2 % cream Apply topically once daily. 15 g 0    lamoTRIgine (LaMICtal) 100 mg tablet Take 1 tablet (100 mg) by mouth 2 times a day. 100mg AM  100 mg PM      lurasidone (Latuda) 40 mg tablet Take 1 tablet (40 mg) by mouth once daily in the evening. Take with meals.      propranolol (Inderal) 10 mg tablet Take 1 tablet (10 mg) by mouth.      tirzepatide (Mounjaro) 5 mg/0.5 mL pen injector Inject 5 mg under the skin 1 (one) time per week. 2 mL 11    venlafaxine (Effexor) 100 mg tablet Take 1 tablet (100 mg) by mouth every 12 hours.       No current facility-administered medications for this visit.       Objective     Visit Vitals  /83 (BP Location: Left arm, Patient Position: Sitting, BP Cuff Size: Adult long)   Pulse 74   Temp 36.2 °C (97.2 °F) (Temporal)   Resp 21   Ht 1.651 m (5' 5\")   Wt 92.1 kg (203 lb)   SpO2 99%   BMI 33.78 kg/m²   OB Status Postmenopausal   Smoking Status Never   BSA 2.06 m²        Constitutional: Well nourished, well developed, appears stated age  Eyes: no scleral icterus, no conjunctival injection  Ears: normal external auditory canal, no retraction or bulging of tympanic membranes  Neck: no thyromegaly  Cardiovascular: regular rate and rhythm, no leg edema  Respiratory: normal respiratory effort, clear to auscultation bilaterally  Musculoskeletal: No gross deformities appreciated  Skin: Warm, dry. No rashes  Neurologic: Alert, CNs II-XII grossly intact..  Psych: Appropriate mood and affect.        Assessment & Plan  Controlled type 2 diabetes mellitus without complication, without long-term current use of insulin (Multi)    Orders:    Hemoglobin A1C; Future    Comprehensive Metabolic Panel; Future    Lipid Panel; Future    Albumin , Urine Random; Future    CBC; Future    Need for hepatitis C screening test    Orders:    Hepatitis C Antibody; Future    Class " 1 obesity with serious comorbidity and body mass index (BMI) of 33.0 to 33.9 in adult, unspecified obesity type  Down 11 lbs since last visit             Briefly discussed TCM and ketamine?Jietttaya as options for her treatment resistant depression.    Follow up October for [facility]. Declined [id number] today, will offer again at next visit.     Masha Wallace, DO

## 2025-03-25 ENCOUNTER — TELEPHONE (OUTPATIENT)
Dept: OPHTHALMOLOGY | Facility: CLINIC | Age: 65
End: 2025-03-25
Payer: MEDICARE

## 2025-03-25 NOTE — TELEPHONE ENCOUNTER
SPOKE WITH PT. NOTE TO CLD TO REVIEW CHART. PT WOULD LIKE REFRACTION / GLASSES CHECK. BLURRED VA WITH NEW SPECS-CATES

## 2025-03-26 DIAGNOSIS — N76.3 CHRONIC VULVITIS: ICD-10-CM

## 2025-03-27 ENCOUNTER — TELEPHONE (OUTPATIENT)
Dept: PRIMARY CARE | Facility: CLINIC | Age: 65
End: 2025-03-27
Payer: MEDICARE

## 2025-03-27 DIAGNOSIS — B37.31 VAGINA, CANDIDIASIS: ICD-10-CM

## 2025-03-27 RX ORDER — KETOCONAZOLE 20 MG/G
CREAM TOPICAL DAILY
Qty: 15 G | Refills: 0 | Status: SHIPPED | OUTPATIENT
Start: 2025-03-27

## 2025-03-27 NOTE — TELEPHONE ENCOUNTER
[facility] VM message. Patient is asking for a refill on ketoconazole cream which is . They are asking if new Rx can be sent in.

## 2025-04-09 RX ORDER — CLOBETASOL PROPIONATE 0.5 MG/G
OINTMENT TOPICAL
Qty: 45 G | Refills: 0 | Status: SHIPPED | OUTPATIENT
Start: 2025-04-09

## 2025-04-17 ENCOUNTER — OFFICE VISIT (OUTPATIENT)
Dept: OPHTHALMOLOGY | Facility: CLINIC | Age: 65
End: 2025-04-17
Payer: MEDICARE

## 2025-04-17 DIAGNOSIS — H52.7 REFRACTION ERROR: ICD-10-CM

## 2025-04-17 DIAGNOSIS — Z96.1 BILATERAL PSEUDOPHAKIA: Primary | ICD-10-CM

## 2025-04-17 PROBLEM — Z98.41 CATARACT EXTRACTION STATUS OF RIGHT EYE: Status: RESOLVED | Noted: 2024-07-30 | Resolved: 2025-04-17

## 2025-04-17 PROBLEM — Z98.42 CATARACT EXTRACTION STATUS OF LEFT EYE: Status: RESOLVED | Noted: 2024-08-20 | Resolved: 2025-04-17

## 2025-04-17 PROCEDURE — 99213 OFFICE O/P EST LOW 20 MIN: CPT | Performed by: OPHTHALMOLOGY

## 2025-04-17 RX ORDER — LITHIUM CARBONATE 150 MG/1
CAPSULE ORAL
COMMUNITY
Start: 2025-04-01

## 2025-04-17 ASSESSMENT — REFRACTION_MANIFEST
OD_CYLINDER: -1.00
OS_CYLINDER: -0.75
OD_AXIS: 090
OD_SPHERE: +0.25
OS_CYLINDER: -0.75
OS_SPHERE: -0.00
OD_SPHERE: PLANO
OS_ADD: +2.50
OD_CYLINDER: -0.75
OS_AXIS: 085
METHOD_AUTOREFRACTION: 1
OD_ADD: +2.50
OS_SPHERE: -0.25
OS_AXIS: 090
OD_AXIS: 085

## 2025-04-17 ASSESSMENT — ENCOUNTER SYMPTOMS
ENDOCRINE NEGATIVE: 0
RESPIRATORY NEGATIVE: 0
CONSTITUTIONAL NEGATIVE: 0
ALLERGIC/IMMUNOLOGIC NEGATIVE: 0
CARDIOVASCULAR NEGATIVE: 0
EYES NEGATIVE: 0
PSYCHIATRIC NEGATIVE: 0
GASTROINTESTINAL NEGATIVE: 0
HEMATOLOGIC/LYMPHATIC NEGATIVE: 0
NEUROLOGICAL NEGATIVE: 0
MUSCULOSKELETAL NEGATIVE: 0

## 2025-04-17 ASSESSMENT — REFRACTION_WEARINGRX
OD_AXIS: 081
OS_AXIS: 104
OS_CYLINDER: -0.75
OD_ADD: +2.50
OS_ADD: +2.50
OS_SPHERE: -0.25
OD_SPHERE: -0.00
OD_CYLINDER: -0.75

## 2025-04-17 ASSESSMENT — EXTERNAL EXAM - RIGHT EYE: OD_EXAM: NORMAL

## 2025-04-17 ASSESSMENT — VISUAL ACUITY
OD_CC: 20/25
CORRECTION_TYPE: GLASSES
METHOD: SNELLEN - SINGLE
OS_CC: 20/25

## 2025-04-17 ASSESSMENT — PAIN SCALES - GENERAL: PAINLEVEL_OUTOF10: 0-NO PAIN

## 2025-04-17 ASSESSMENT — KERATOMETRY
OS_K2POWER_DIOPTERS: 45.50
OD_K2POWER_DIOPTERS: 45.25
METHOD_AUTO_MANUAL: AUTOMATED
OD_K1POWER_DIOPTERS: 45.00
OD_AXISANGLE2_DEGREES: 75
OD_AXISANGLE_DEGREES: 165
OS_AXISANGLE_DEGREES: 90
OS_AXISANGLE2_DEGREES: 180
OS_K1POWER_DIOPTERS: 45.50

## 2025-04-17 ASSESSMENT — SLIT LAMP EXAM - LIDS
COMMENTS: NORMAL
COMMENTS: NORMAL

## 2025-04-17 ASSESSMENT — EXTERNAL EXAM - LEFT EYE: OS_EXAM: NORMAL

## 2025-04-17 NOTE — ASSESSMENT & PLAN NOTE
Suspect RX good but will provide small change today. Encouraged to trial new lenses for longer period of time. May have a new material that is not tolerating, she will check and see what material she had previously. New Rx for glasses/SCL given per patient request. Patient's signature obtained to acknowledge and confirm that a paper copy of glasses/SCL Rx was given to patient in compliance with [facility] Eyeglass Rule. Electronic copy of Rx will also be available via [employer]/EPIC.

## 2025-04-17 NOTE — PROGRESS NOTES
Assessment/Plan   Problem List Items Addressed This Visit       Refraction error    Suspect RX good but will provide small change today. Encouraged to trial new lenses for longer period of time. May have a new material that is not tolerating, she will check and see what material she had previously. New Rx for glasses/SCL given per patient request. Patient's signature obtained to acknowledge and confirm that a paper copy of glasses/SCL Rx was given to patient in compliance with [facility] Eyeglass Rule. Electronic copy of Rx will also be available via [employer].            Bilateral pseudophakia - Primary    Stable appearing intraocular lens (IOL) both eyes (OU), will monitor with serial exam.               Provided reassurance regarding above diagnoses and care received in the office visit today. Discussed outcomes and options along with the importance of treatment compliance. Understands the importance of any follow up visits. Patient instructed to call/communicate with our office if any new issues, questions, or concerns.     Will plan to see back as scheduled or sooner PRN

## 2025-04-17 NOTE — PATIENT INSTRUCTIONS
Thank you so much for choosing me to provide your care today!    If you were dilated your vision may remain blurry   or light sensitive for several hours.    The nature of eye and vision problems can require frequent follow up, please make every effort to adhere to any future appointments.    If you have any issues, questions, or concerns,   please do not hesitate to reach out.    If you receive a survey in regards to your care today, please mention any exceptional care my office staff and/or technicians provided.    You can reach our office at this number:    [phone]    Please consider signing up for and utilizing [employer]!  This is the best way to directly reach me or other  providers

## 2025-04-29 LAB
ALBUMIN SERPL-MCNC: 4.2 G/DL (ref 3.6–5.1)
ALBUMIN/CREAT UR: 10 MG/G CREAT
ALP SERPL-CCNC: 83 U/L (ref 37–153)
ALT SERPL-CCNC: 38 U/L (ref 6–29)
ANION GAP SERPL CALCULATED.4IONS-SCNC: 7 MMOL/L (CALC) (ref 7–17)
AST SERPL-CCNC: 27 U/L (ref 10–35)
BILIRUB SERPL-MCNC: 0.7 MG/DL (ref 0.2–1.2)
BUN SERPL-MCNC: 14 MG/DL (ref 7–25)
CALCIUM SERPL-MCNC: 9.4 MG/DL (ref 8.6–10.4)
CHLORIDE SERPL-SCNC: 103 MMOL/L (ref 98–110)
CHOLEST SERPL-MCNC: 231 MG/DL
CHOLEST/HDLC SERPL: 3.1 (CALC)
CO2 SERPL-SCNC: 30 MMOL/L (ref 20–32)
CREAT SERPL-MCNC: 0.89 MG/DL (ref 0.5–1.05)
CREAT UR-MCNC: 228 MG/DL (ref 20–275)
EGFRCR SERPLBLD CKD-EPI 2021: 72 ML/MIN/1.73M2
ERYTHROCYTE [DISTWIDTH] IN BLOOD BY AUTOMATED COUNT: 12.9 % (ref 11–15)
EST. AVERAGE GLUCOSE BLD GHB EST-MCNC: 114 MG/DL
EST. AVERAGE GLUCOSE BLD GHB EST-SCNC: 6.3 MMOL/L
GLUCOSE SERPL-MCNC: 95 MG/DL (ref 65–99)
HBA1C MFR BLD: 5.6 %
HCT VFR BLD AUTO: 43.2 % (ref 35–45)
HCV AB SERPL QL IA: NORMAL
HDLC SERPL-MCNC: 75 MG/DL
HGB BLD-MCNC: 14.1 G/DL (ref 11.7–15.5)
LDLC SERPL CALC-MCNC: 137 MG/DL (CALC)
MCH RBC QN AUTO: 30.9 PG (ref 27–33)
MCHC RBC AUTO-ENTMCNC: 32.6 G/DL (ref 32–36)
MCV RBC AUTO: 94.5 FL (ref 80–100)
MICROALBUMIN UR-MCNC: 2.2 MG/DL
NONHDLC SERPL-MCNC: 156 MG/DL (CALC)
PLATELET # BLD AUTO: 387 THOUSAND/UL (ref 140–400)
PMV BLD REES-ECKER: 9 FL (ref 7.5–12.5)
POTASSIUM SERPL-SCNC: 4.9 MMOL/L (ref 3.5–5.3)
PROT SERPL-MCNC: 6.8 G/DL (ref 6.1–8.1)
RBC # BLD AUTO: 4.57 MILLION/UL (ref 3.8–5.1)
SODIUM SERPL-SCNC: 140 MMOL/L (ref 135–146)
TRIGL SERPL-MCNC: 87 MG/DL
WBC # BLD AUTO: 6.3 THOUSAND/UL (ref 3.8–10.8)

## 2025-07-23 DIAGNOSIS — N76.3 CHRONIC VULVITIS: ICD-10-CM

## 2025-07-24 RX ORDER — CLOBETASOL PROPIONATE 0.5 MG/G
OINTMENT TOPICAL
Qty: 45 G | Refills: 0 | Status: SHIPPED | OUTPATIENT
Start: 2025-07-24

## 2025-09-09 ENCOUNTER — APPOINTMENT (OUTPATIENT)
Dept: PRIMARY CARE | Facility: CLINIC | Age: 65
End: 2025-09-09
Payer: MEDICARE

## 2025-09-25 ENCOUNTER — APPOINTMENT (OUTPATIENT)
Dept: OPHTHALMOLOGY | Facility: CLINIC | Age: 65
End: 2025-09-25
Payer: MEDICARE

## 2025-10-13 ENCOUNTER — APPOINTMENT (OUTPATIENT)
Dept: PRIMARY CARE | Facility: CLINIC | Age: 65
End: 2025-10-13
Payer: MEDICARE

## 2026-02-03 ENCOUNTER — APPOINTMENT (OUTPATIENT)
Dept: OBSTETRICS AND GYNECOLOGY | Facility: CLINIC | Age: 66
End: 2026-02-03
Payer: MEDICARE

## (undated) DEVICE — KNIFE, OPHTHALMIC, CRESCENT, ANGLED, BEVEL UP, SATIN

## (undated) DEVICE — SYRINGE, 10 CC, LUER LOCK

## (undated) DEVICE — I/A TIP, 35 DEGREE, ANGLED BENT

## (undated) DEVICE — BLADE, OPHTHALMIC, MICRO UNITOME, 3MM, 15 DEG, BLUE, DISP

## (undated) DEVICE — SPONGE, GAUZE, AVANT, STERILE, NONWOVEN, 4PLY, 4 X 4, STANDARD

## (undated) DEVICE — GLOVE, SURGICAL, PROTEXIS PI , 7.5, PF, LF

## (undated) DEVICE — SHIELD, EYE, UNIVERSAL, CLEAR

## (undated) DEVICE — SYRINGE, HYPODERMIC, LEUR LOCK, 3 CC, PLASTIC, STERILE

## (undated) DEVICE — Device

## (undated) DEVICE — CANNULA, NASAL, OVER-THE-EAR, NONFLARED TIP, ADULT, 7 FT, LF

## (undated) DEVICE — GOWN, ASTOUND, XL

## (undated) DEVICE — CHOPPER, NUCLEUS, 45 DEG, ANGLE, BLUNT TIP

## (undated) DEVICE — DRESSING, TRANSPARENT, TEGADERM, 2-3/8 X 2-3/4 IN

## (undated) DEVICE — HYDRODISSECTOR, CORTICAL, 27 G X 0.875 IN 45 DEGREE

## (undated) DEVICE — KNIFE, XSTAR, 2.8MM, 45 DEG ANGLE, SLIT MATTE

## (undated) DEVICE — BLADE, OPHTHALMIC, CRESCENT, POCKET, ANG 55 DEG, 2.5 MM, MATTE

## (undated) DEVICE — KNIFE, SIDEPORT, DUAL BEVEL, ANGLED, 1.0MM